# Patient Record
Sex: FEMALE | Race: BLACK OR AFRICAN AMERICAN | NOT HISPANIC OR LATINO | Employment: UNEMPLOYED | URBAN - METROPOLITAN AREA
[De-identification: names, ages, dates, MRNs, and addresses within clinical notes are randomized per-mention and may not be internally consistent; named-entity substitution may affect disease eponyms.]

---

## 2017-04-21 ENCOUNTER — OFFICE VISIT (OUTPATIENT)
Dept: FAMILY MEDICINE | Facility: CLINIC | Age: 47
End: 2017-04-21
Payer: COMMERCIAL

## 2017-04-21 VITALS
OXYGEN SATURATION: 99 % | HEIGHT: 62 IN | SYSTOLIC BLOOD PRESSURE: 130 MMHG | BODY MASS INDEX: 26.39 KG/M2 | DIASTOLIC BLOOD PRESSURE: 60 MMHG | WEIGHT: 143.44 LBS | HEART RATE: 94 BPM | TEMPERATURE: 98 F

## 2017-04-21 DIAGNOSIS — R05.9 COUGHING: ICD-10-CM

## 2017-04-21 DIAGNOSIS — Z12.11 SCREEN FOR COLON CANCER: ICD-10-CM

## 2017-04-21 DIAGNOSIS — R00.2 HEART PALPITATIONS: ICD-10-CM

## 2017-04-21 DIAGNOSIS — Z12.31 OTHER SCREENING MAMMOGRAM: ICD-10-CM

## 2017-04-21 DIAGNOSIS — Z00.00 PHYSICAL EXAM: Primary | ICD-10-CM

## 2017-04-21 PROCEDURE — 99386 PREV VISIT NEW AGE 40-64: CPT | Mod: S$GLB,,, | Performed by: NURSE PRACTITIONER

## 2017-04-21 PROCEDURE — 99999 PR PBB SHADOW E&M-EST. PATIENT-LVL III: CPT | Mod: PBBFAC,,, | Performed by: NURSE PRACTITIONER

## 2017-04-21 RX ORDER — METHYLPREDNISOLONE 4 MG/1
TABLET ORAL
Qty: 1 PACKAGE | Refills: 0 | Status: SHIPPED | OUTPATIENT
Start: 2017-04-21 | End: 2017-05-05 | Stop reason: ALTCHOICE

## 2017-04-21 RX ORDER — PROMETHAZINE HYDROCHLORIDE AND DEXTROMETHORPHAN HYDROBROMIDE 6.25; 15 MG/5ML; MG/5ML
5 SYRUP ORAL EVERY 6 HOURS PRN
Qty: 120 ML | Refills: 0 | Status: SHIPPED | OUTPATIENT
Start: 2017-04-21 | End: 2017-05-01

## 2017-04-21 NOTE — PROGRESS NOTES
Subjective:       Patient ID: Vipin Whitaker is a 46 y.o. female.    Chief Complaint: Annual Exam    HPI Comments: 46-year-old female presents to the clinic today for an annual physical exam.  She denies any specific complaints today.  She said her mother  of colorectal cancer in her early 60s says she thinks she needs a colonoscopy.  She is due for mammogram.  She states it and removing when she lays down in bed she has heart palpitations and then she falls asleep with them.  She denies any chest pain, shortness breath, diaphoresis, or nausea with these palpitations.    Past Medical History:   Diagnosis Date    Abnormal Pap smear      Past Surgical History:   Procedure Laterality Date    SALPINGECTOMY      he   reports that she has never smoked. She does not have any smokeless tobacco history on file. She reports that she does not drink alcohol.  Review of Systems   Constitutional: Negative for activity change, appetite change, chills, fatigue and fever.   HENT: Negative for congestion, ear discharge, ear pain, postnasal drip, rhinorrhea, sinus pressure, sneezing and sore throat.    Eyes: Negative for pain, discharge, redness and itching.   Respiratory: Positive for cough. Negative for shortness of breath and wheezing.    Cardiovascular: Positive for palpitations. Negative for chest pain and leg swelling.   Gastrointestinal: Negative for abdominal pain, constipation, diarrhea, nausea and vomiting.   Genitourinary: Negative for difficulty urinating, flank pain, hematuria and vaginal discharge.   Musculoskeletal: Negative for back pain, neck pain and neck stiffness.   Skin: Positive for rash.   Neurological: Negative for dizziness, light-headedness and headaches.       Objective:      Physical Exam   Constitutional: She is oriented to person, place, and time. She appears well-developed and well-nourished. No distress.   HENT:   Head: Normocephalic and atraumatic.   Right Ear: External ear normal.   Left  Ear: External ear normal.   Nose: Nose normal.   Mouth/Throat: Oropharynx is clear and moist. No oropharyngeal exudate.   Eyes: Conjunctivae and EOM are normal. Pupils are equal, round, and reactive to light. Right eye exhibits no discharge. Left eye exhibits no discharge. No scleral icterus.   Neck: Normal range of motion. Neck supple. No JVD present. No thyromegaly present.   Cardiovascular: Normal rate, regular rhythm and normal heart sounds.  Exam reveals no gallop and no friction rub.    No murmur heard.  Pulmonary/Chest: Effort normal and breath sounds normal. No respiratory distress. She has no wheezes. She has no rales.   Abdominal: Soft. Bowel sounds are normal. There is no tenderness.   Musculoskeletal: Normal range of motion. She exhibits no edema.   Lymphadenopathy:     She has no cervical adenopathy.   Neurological: She is alert and oriented to person, place, and time.   Skin: Skin is warm and dry. She is not diaphoretic.   Psychiatric: She has a normal mood and affect.       Assessment:       1. Physical exam    2. Other screening mammogram    3. Screen for colon cancer    4. Coughing    5. Heart palpitations        Plan:         Physical exam  -     CBC auto differential; Future; Expected date: 4/21/17  -     Comprehensive metabolic panel; Future; Expected date: 4/21/17  -     Lipid panel; Future; Expected date: 4/21/17  -     Hepatitis C antibody; Future; Expected date: 4/21/17  -     TSH; Future; Expected date: 4/21/17  -     Urinalysis; Future; Expected date: 4/21/17  -     Iron and TIBC; Future; Expected date: 4/21/17  -     Ferritin; Future; Expected date: 4/21/17  -     Folate; Future; Expected date: 4/21/17  -     Vitamin B12; Future; Expected date: 4/21/17  -     T4, free; Future; Expected date: 4/21/17  -     Vitamin D; Future; Expected date: 4/21/17    Other screening mammogram  -     Mammo Digital Screening Bilat with CAD; Future; Expected date: 4/21/17    Screen for colon cancer  -      Case request GI: COLONOSCOPY    Coughing  -     promethazine-dextromethorphan (PROMETHAZINE-DM) 6.25-15 mg/5 mL Syrp; Take 5 mLs by mouth every 6 (six) hours as needed.  Dispense: 120 mL; Refill: 0  -     methylPREDNISolone (MEDROL DOSEPACK) 4 mg tablet; use as directed  Dispense: 1 Package; Refill: 0    Heart palpitations  -     Ambulatory referral to Cardiology        Return in about 2 weeks (around 5/5/2017), or establish care with Dr. Zafar .

## 2017-04-21 NOTE — MR AVS SNAPSHOT
[facility]  [address]  [place] [address]  Phone: [phone]  Fax: [phone]                  Vipin Whitaker   2017 11:00 AM   Office Visit    Description:  Female : 1970   Provider:  GEMMA Zimmerman   Department:  [facility] Medicine           Reason for Visit     Annual Exam           Diagnoses this Visit        Comments    Physical exam    -  Primary     Other screening mammogram         Screen for colon cancer         Coughing                To Do List           Future Appointments        Provider Department Dept Phone    2017 7:00 AM Rena Zafar MD [facility] [phone]    2017 7:30 AM LAB, [facility] [phone]    2017 7:45 AM LAB, [facility] [phone]    2017 8:00 AM [facility] MAMMO1 [facility] [phone]    2017 9:00 AM GEMMA Zimmerman [facility] [phone]      Goals (5 Years of Data)     None       These Medications        Disp Refills Start End    promethazine-dextromethorphan (PROMETHAZINE-DM) 6.25-15 mg/5 mL Syrp 120 mL 0 2017    Take 5 mLs by mouth every 6 (six) hours as needed. - Oral    Pharmacy: [facility]/pharmacy #[id number] - [place] - [address] Ph #: [phone]       methylPREDNISolone (MEDROL DOSEPACK) 4 mg tablet 1 Package 0 2017     use as directed    Pharmacy: [facility]/pharmacy #[id number] - [place] - [address] Ph #: [phone]         [facility]     [facility] Care [facility] -  Assistance  Unless otherwise directed by your provider, please contact [facility], our nurse care line that is available for  assistance.     Registered nurses in the [facility] provide: appointment scheduling, clinical advisement, health education, and other advisory services.  Call: [phone] (toll free)               Medications  "          START taking these NEW medications        Refills    promethazine-dextromethorphan (PROMETHAZINE-DM) 6.25-15 mg/5 mL Syrp 0    Sig: Take 5 mLs by mouth every 6 (six) hours as needed.    Class: Normal    Route: Oral    methylPREDNISolone (MEDROL DOSEPACK) 4 mg tablet 0    Sig: use as directed    Class: Normal           Verify that the below list of medications is an accurate representation of the medications you are currently taking.  If none reported, the list may be blank. If incorrect, please contact your healthcare provider. Carry this list with you in case of emergency.           Current Medications     methylPREDNISolone (MEDROL DOSEPACK) 4 mg tablet use as directed    promethazine-dextromethorphan (PROMETHAZINE-DM) 6.25-15 mg/5 mL Syrp Take 5 mLs by mouth every 6 (six) hours as needed.           Clinical Reference Information           Your Vitals Were     BP Pulse Temp Height Weight Last Period    130/60 (BP Location: Left arm, Patient Position: Sitting, BP Method: Manual) 94 98.3 °F (36.8 °C) (Oral) 5' 2" (1.575 m) 65.1 kg (143 lb 6.6 oz) 06/21/2015    SpO2 BMI             99% 26.23 kg/m2         Blood Pressure          Most Recent Value    BP  130/60      Allergies as of 4/21/2017     No Known Allergies      Immunizations Administered on Date of Encounter - 4/21/2017     None      Orders Placed During Today's Visit      Normal Orders This Visit    Case request GI: COLONOSCOPY     Future Labs/Procedures Expected by Expires    CBC auto differential  4/21/2017 4/21/2018    Comprehensive metabolic panel  4/21/2017 4/21/2018    Ferritin  4/21/2017 4/21/2018    Folate  4/21/2017 4/21/2018    Hepatitis C antibody  4/21/2017 6/20/2018    Iron and TIBC  4/21/2017 4/21/2018    Lipid panel  4/21/2017 4/21/2018    Mammo Digital Screening Bilat with CAD  4/21/2017 6/21/2018    T4, free  4/21/2017 6/20/2018    TSH  4/21/2017 4/21/2018    Urinalysis  4/21/2017 6/20/2018    Vitamin B12  4/21/2017 4/21/2018    " Vitamin D  4/21/2017 4/21/2018      [facility] Sign-Up     Activating your [facility] account is as easy as 1-2-3!     1) Visit [email or web address], select Sign Up Now, enter this activation code and your date of birth, then select Next.  [email or web address]  Expires: 6/5/2017 11:30 AM      2) Create a username and password to use when you visit [facility] in the future and select a security question in case you lose your password and select Next.    3) Enter your e-mail address and click Sign Up!    Additional Information  If you have questions, please e-mail [email or web address] or call [phone] to talk to our [facility] staff. Remember, [facility] is NOT to be used for urgent needs. For medical emergencies, dial 911.         Language Assistance Services     ATTENTION: Language assistance services are available, free of charge. Please call [phone].      ATENCIÓN: Si habla español, tiene a estrada disposición servicios gratuitos de asistencia lingüística. Llame al [phone].     WILLIS Ý: N?u b?n nói Ti?ng Vi?t, có các d?ch v? h? tr? ngôn ng? mi?n phí dành cho b?n. G?i s? [phone].         [facility] complies with applicable Federal civil rights laws and does not discriminate on the basis of race, color, national origin, age, disability, or sex.

## 2017-05-05 ENCOUNTER — LAB VISIT (OUTPATIENT)
Dept: LAB | Facility: HOSPITAL | Age: 47
End: 2017-05-05
Attending: FAMILY MEDICINE
Payer: COMMERCIAL

## 2017-05-05 ENCOUNTER — OFFICE VISIT (OUTPATIENT)
Dept: FAMILY MEDICINE | Facility: CLINIC | Age: 47
End: 2017-05-05
Payer: COMMERCIAL

## 2017-05-05 VITALS
TEMPERATURE: 98 F | DIASTOLIC BLOOD PRESSURE: 76 MMHG | WEIGHT: 141.56 LBS | RESPIRATION RATE: 18 BRPM | SYSTOLIC BLOOD PRESSURE: 142 MMHG | OXYGEN SATURATION: 98 % | HEIGHT: 62 IN | HEART RATE: 87 BPM | BODY MASS INDEX: 26.05 KG/M2

## 2017-05-05 DIAGNOSIS — Z00.00 PHYSICAL EXAM: ICD-10-CM

## 2017-05-05 DIAGNOSIS — R63.4 WEIGHT LOSS: ICD-10-CM

## 2017-05-05 DIAGNOSIS — Z80.0 FAMILY HX OF COLON CANCER: ICD-10-CM

## 2017-05-05 DIAGNOSIS — Z11.3 SCREENING FOR STD (SEXUALLY TRANSMITTED DISEASE): ICD-10-CM

## 2017-05-05 DIAGNOSIS — Z12.4 PAP SMEAR FOR CERVICAL CANCER SCREENING: Primary | ICD-10-CM

## 2017-05-05 LAB
25(OH)D3+25(OH)D2 SERPL-MCNC: 27 NG/ML
ALBUMIN SERPL BCP-MCNC: 3.9 G/DL
ALP SERPL-CCNC: 120 U/L
ALT SERPL W/O P-5'-P-CCNC: 29 U/L
ANION GAP SERPL CALC-SCNC: 7 MMOL/L
AST SERPL-CCNC: 20 U/L
BASOPHILS # BLD AUTO: 0.01 K/UL
BASOPHILS NFR BLD: 0.3 %
BILIRUB SERPL-MCNC: 0.8 MG/DL
BUN SERPL-MCNC: 14 MG/DL
CALCIUM SERPL-MCNC: 9.6 MG/DL
CHLORIDE SERPL-SCNC: 108 MMOL/L
CHOLEST/HDLC SERPL: 2.4 {RATIO}
CO2 SERPL-SCNC: 26 MMOL/L
CREAT SERPL-MCNC: 0.6 MG/DL
DIFFERENTIAL METHOD: ABNORMAL
EOSINOPHIL # BLD AUTO: 0.2 K/UL
EOSINOPHIL NFR BLD: 4.2 %
ERYTHROCYTE [DISTWIDTH] IN BLOOD BY AUTOMATED COUNT: 13.4 %
EST. GFR  (AFRICAN AMERICAN): >60 ML/MIN/1.73 M^2
EST. GFR  (NON AFRICAN AMERICAN): >60 ML/MIN/1.73 M^2
FERRITIN SERPL-MCNC: 26 NG/ML
FOLATE SERPL-MCNC: 17.1 NG/ML
GLUCOSE SERPL-MCNC: 92 MG/DL
HCT VFR BLD AUTO: 35.3 %
HDL/CHOLESTEROL RATIO: 41 %
HDLC SERPL-MCNC: 117 MG/DL
HDLC SERPL-MCNC: 48 MG/DL
HGB BLD-MCNC: 11 G/DL
IRON SERPL-MCNC: 44 UG/DL
LDLC SERPL CALC-MCNC: 57.6 MG/DL
LYMPHOCYTES # BLD AUTO: 1.4 K/UL
LYMPHOCYTES NFR BLD: 36.8 %
MCH RBC QN AUTO: 25.1 PG
MCHC RBC AUTO-ENTMCNC: 31.2 %
MCV RBC AUTO: 80 FL
MONOCYTES # BLD AUTO: 0.6 K/UL
MONOCYTES NFR BLD: 15.7 %
NEUTROPHILS # BLD AUTO: 1.6 K/UL
NEUTROPHILS NFR BLD: 42.7 %
NONHDLC SERPL-MCNC: 69 MG/DL
PLATELET # BLD AUTO: 272 K/UL
PMV BLD AUTO: 12.6 FL
POTASSIUM SERPL-SCNC: 3.7 MMOL/L
PROT SERPL-MCNC: 7.2 G/DL
RBC # BLD AUTO: 4.39 M/UL
SATURATED IRON: 11 %
SODIUM SERPL-SCNC: 141 MMOL/L
T4 FREE SERPL-MCNC: 2.52 NG/DL
TOTAL IRON BINDING CAPACITY: 401 UG/DL
TRANSFERRIN SERPL-MCNC: 271 MG/DL
TRIGL SERPL-MCNC: 57 MG/DL
TSH SERPL DL<=0.005 MIU/L-ACNC: <0.01 UIU/ML
VIT B12 SERPL-MCNC: 363 PG/ML
WBC # BLD AUTO: 3.83 K/UL

## 2017-05-05 PROCEDURE — 82306 VITAMIN D 25 HYDROXY: CPT

## 2017-05-05 PROCEDURE — 80053 COMPREHEN METABOLIC PANEL: CPT

## 2017-05-05 PROCEDURE — 85025 COMPLETE CBC W/AUTO DIFF WBC: CPT

## 2017-05-05 PROCEDURE — 87480 CANDIDA DNA DIR PROBE: CPT

## 2017-05-05 PROCEDURE — 84439 ASSAY OF FREE THYROXINE: CPT

## 2017-05-05 PROCEDURE — 88175 CYTOPATH C/V AUTO FLUID REDO: CPT

## 2017-05-05 PROCEDURE — 87591 N.GONORRHOEAE DNA AMP PROB: CPT

## 2017-05-05 PROCEDURE — 82607 VITAMIN B-12: CPT

## 2017-05-05 PROCEDURE — 1160F RVW MEDS BY RX/DR IN RCRD: CPT | Mod: S$GLB,,, | Performed by: FAMILY MEDICINE

## 2017-05-05 PROCEDURE — 99999 PR PBB SHADOW E&M-EST. PATIENT-LVL IV: CPT | Mod: PBBFAC,,, | Performed by: FAMILY MEDICINE

## 2017-05-05 PROCEDURE — 80061 LIPID PANEL: CPT

## 2017-05-05 PROCEDURE — 83540 ASSAY OF IRON: CPT

## 2017-05-05 PROCEDURE — 82746 ASSAY OF FOLIC ACID SERUM: CPT

## 2017-05-05 PROCEDURE — 86803 HEPATITIS C AB TEST: CPT

## 2017-05-05 PROCEDURE — 84443 ASSAY THYROID STIM HORMONE: CPT

## 2017-05-05 PROCEDURE — 82728 ASSAY OF FERRITIN: CPT

## 2017-05-05 PROCEDURE — 99214 OFFICE O/P EST MOD 30 MIN: CPT | Mod: S$GLB,,, | Performed by: FAMILY MEDICINE

## 2017-05-05 NOTE — PROGRESS NOTES
Chief Complaint   Patient presents with    Annual Exam    Establish Care       HPI-pt's father also present and answers for pt  Vipin Whitaker is a 46 y.o. female with multiple medical diagnoses as listed in the medical history and problem list that presents for establishment of care . She is here for her breast exam and pap smear. Her annual was done by another provider at her last visit. She has not gotten the lab work yet and will get that today along with the mammogram. She has been having 20lb weight loss over the past 6 months. She has not had a change in her eating habits and eats three meals daily with snacks. She snacks all day long. She has not had any recent stress, though she seems anxious and fidgets. She is concerned because her mother  of colon cancer four years ago. She was diagnosed at age 62. The patient would like a colonoscopy and this has been ordered at her last visit. She has not had any change in bowel habits or blood in her stool.     She does drink 3 cups of coffee and six to eight sodas daily she drinks almost no water. She has frequent hot flashes and has been in menopause for two years.     PAST MEDICAL HISTORY:  Past Medical History:   Diagnosis Date    Abnormal Pap smear        PAST SURGICAL HISTORY:  Past Surgical History:   Procedure Laterality Date    SALPINGECTOMY         SOCIAL HISTORY:  Social History     Social History    Marital status:      Spouse name: N/A    Number of children: N/A    Years of education: N/A     Occupational History    Not on file.     Social History Main Topics    Smoking status: Never Smoker    Smokeless tobacco: Not on file    Alcohol use No    Drug use: Not on file    Sexual activity: Not on file     Other Topics Concern    Not on file     Social History Narrative       FAMILY HISTORY:  Family History   Problem Relation Age of Onset    Colon cancer Mother        ALLERGIES AND MEDICATIONS: updated and reviewed.  Review of  "patient's allergies indicates:  No Known Allergies  No current outpatient prescriptions on file.     No current facility-administered medications for this visit.        ROS  Review of Systems   Constitutional: Positive for unexpected weight change. Negative for chills, diaphoresis, fatigue and fever.   HENT: Negative for rhinorrhea, sinus pressure, sore throat and tinnitus.    Eyes: Negative for photophobia and visual disturbance.   Respiratory: Negative for cough, shortness of breath and wheezing.    Cardiovascular: Negative for chest pain and palpitations.   Gastrointestinal: Negative for abdominal pain, blood in stool, constipation, diarrhea, nausea and vomiting.   Genitourinary: Negative for dysuria, flank pain, frequency and vaginal discharge.   Musculoskeletal: Negative for arthralgias and joint swelling.   Skin: Negative for rash.   Neurological: Negative for speech difficulty, weakness, light-headedness and headaches.   Psychiatric/Behavioral: Negative for behavioral problems and dysphoric mood.       Physical Exam  Vitals:    05/05/17 0718   BP: (!) 140/66   Pulse: 87   Resp: 18   Temp: 98 °F (36.7 °C)   TempSrc: Oral   SpO2: 98%   Weight: 64.2 kg (141 lb 8.6 oz)   Height: 5' 2" (1.575 m)    Body mass index is 25.89 kg/(m^2).  Weight: 64.2 kg (141 lb 8.6 oz)   Height: 5' 2" (157.5 cm)     Physical Exam   Constitutional: She is oriented to person, place, and time. She appears well-developed and well-nourished. No distress.   HENT:   Head: Normocephalic and atraumatic.   Nose: Nose normal.   Mouth/Throat: Oropharynx is clear and moist. No oropharyngeal exudate.   Eyes: EOM are normal.   Neck: Neck supple.   Cardiovascular: Normal rate and regular rhythm.  Exam reveals no gallop and no friction rub.    No murmur heard.  Pulmonary/Chest: Effort normal and breath sounds normal. No respiratory distress. She has no wheezes. She has no rales.   Abdominal: Soft. Bowel sounds are normal. She exhibits no distension " and no mass. There is no tenderness. There is no rebound and no guarding.   Genitourinary:   Genitourinary Comments:   GYN Exam    Medical assistant was present in room and assisted with procedure. Pap smear was performed, specimen collected with cytobrush. No external lesions noted. Normal vaginal mucosa.  Cervix normal appearing cervix without discharge or lesions,no vaginal discharge.    Bimanual exam performed:  no cervical motion tenderness noted, no uterine or ovarian masses palpated.       inspection negative, no nipple discharge or bleeding, no masses or nodularity palpable No cervical, clavicular or axillary LAD         Lymphadenopathy:     She has no cervical adenopathy.   Neurological: She is alert and oriented to person, place, and time.   Skin: Skin is warm and dry. No rash noted.   Psychiatric: She has a normal mood and affect. Her behavior is normal.   Nursing note and vitals reviewed.      Health Maintenance       Date Due Completion Date    Lipid Panel 1970 ---    Pap Smear 4/24/2014 4/24/2013    Mammogram 5/1/2014 5/1/2013    Influenza Vaccine 8/1/2017 4/21/2017 (Declined)    Override on 4/21/2017: Declined    TETANUS VACCINE 5/5/2027 5/5/2017 (Declined)    Override on 5/5/2017: Declined            ASSESSMENT     1. Pap smear for cervical cancer screening    2. Weight loss    3. Family hx of colon cancer    4. Screening for STD (sexually transmitted disease)        PLAN:     Pap smear for cervical cancer screening  -     Liquid-based pap smear, screening    Weight loss    Family hx of colon cancer    Screening for STD (sexually transmitted disease)  -     C. trachomatis/N. gonorrhoeae by AMP DNA Urine  -     HIV-1 and HIV-2 antibodies; Future; Expected date: 5/5/17  -     RPR; Future; Expected date: 5/5/17  -     Hepatitis panel, acute; Future; Expected date: 5/5/17  -     Vaginosis Screen by DNA Probe        She should check with her insurance company to see that the c-scope is covered as she  has not yet reached ten years prior to diagnosis  I suspect her weight loss is more due to her caffeine intake but we will follow labwork and check thyroid and blood count  She should decrease her intake of cokes by one and increase water intake to 120 daily.        Rena Zafar MD  05/05/2017 8:39 AM        Return in about 4 weeks (around 6/2/2017) for Follow up.

## 2017-05-05 NOTE — MR AVS SNAPSHOT
[facility]  [address] [place] [address]  Phone: [phone]  Fax: [phone]                  Vipin Whitaker   2017 7:00 AM   Office Visit    Description:  Female : 1970   Provider:  Rena Zafar MD   Department:  [facility]           Reason for Visit     Annual Exam     Establish Care           Diagnoses this Visit        Comments    Pap smear for cervical cancer screening    -  Primary     Weight loss         Family hx of colon cancer         Screening for STD (sexually transmitted disease)                To Do List           Future Appointments        Provider Department Dept Phone    2017 8:45 AM LAB, [facility] [phone]    2017 10:00 AM Damion Bahena MD [facility] [phone]    2017 2:00 PM [facility] MAMMO1 [facility] [phone]      Goals (5 Years of Data)     None      Follow-Up and Disposition     Return in about 4 weeks (around 2017) for Follow up.      [facility]     [facility] Care [facility] -  Assistance  Unless otherwise directed by your provider, please contact [facility], our nurse care line that is available for  assistance.     Registered nurses in the [facility] provide: appointment scheduling, clinical advisement, health education, and other advisory services.  Call: [phone] (toll free)               Medications           STOP taking these medications     methylPREDNISolone (MEDROL DOSEPACK) 4 mg tablet use as directed           Verify that the below list of medications is an accurate representation of the medications you are currently taking.  If none reported, the list may be blank. If incorrect, please contact your healthcare provider. Carry this list with you in case of emergency.           Current Medications            Clinical Reference Information           Your Vitals Were     BP Pulse Temp Resp  "Height Weight    140/66 87 98 °F (36.7 °C) (Oral) 18 5' 2" (1.575 m) 64.2 kg (141 lb 8.6 oz)    Last Period SpO2 BMI          06/21/2015 98% 25.89 kg/m2        Blood Pressure          Most Recent Value    BP  (!)  140/66      Allergies as of 5/5/2017     No Known Allergies      Immunizations Administered on Date of Encounter - 5/5/2017     None      Orders Placed During Today's Visit      Normal Orders This Visit    C. trachomatis/N. gonorrhoeae by AMP DNA Urine     Liquid-based pap smear, screening     Vaginosis Screen by DNA Probe     Future Labs/Procedures Expected by Expires    Hepatitis panel, acute  5/5/2017 7/4/2018    HIV-1 and HIV-2 antibodies  5/5/2017 5/5/2018    RPR  5/5/2017 7/4/2018      [facility] Sign-Up     Activating your [facility] account is as easy as 1-2-3!     1) Visit [email or web address], select Sign Up Now, enter this activation code and your date of birth, then select Next.  [email or web address]  Expires: 6/5/2017 11:30 AM      2) Create a username and password to use when you visit [facility] in the future and select a security question in case you lose your password and select Next.    3) Enter your e-mail address and click Sign Up!    Additional Information  If you have questions, please e-mail [email or web address] or call [phone] to talk to our [facility] staff. Remember, [facility] is NOT to be used for urgent needs. For medical emergencies, dial 911.         Language Assistance Services     ATTENTION: Language assistance services are available, free of charge. Please call [phone].      ATENCIÓN: Si habla saritha, tiene a estrada disposición servicios gratuitos de asistencia lingüística. Llame al [phone].     CHÚ Ý: N?u b?n nói Ti?ng Vi?t, có các d?ch v? h? tr? ngôn ng? mi?n phí dành cho b?n. G?i s? [phone].         [facility] complies with applicable Federal civil rights laws and does not discriminate on the basis of race, color, national origin, age, " disability, or sex.

## 2017-05-06 LAB
C TRACH DNA SPEC QL NAA+PROBE: NOT DETECTED
CANDIDA RRNA VAG QL PROBE: NEGATIVE
G VAGINALIS RRNA GENITAL QL PROBE: POSITIVE
N GONORRHOEA DNA SPEC QL NAA+PROBE: NOT DETECTED
T VAGINALIS RRNA GENITAL QL PROBE: NEGATIVE

## 2017-05-08 ENCOUNTER — TELEPHONE (OUTPATIENT)
Dept: FAMILY MEDICINE | Facility: CLINIC | Age: 47
End: 2017-05-08

## 2017-05-08 DIAGNOSIS — R79.89 ABNORMAL TSH: Primary | ICD-10-CM

## 2017-05-08 LAB — HCV AB SERPL QL IA: NEGATIVE

## 2017-05-08 NOTE — TELEPHONE ENCOUNTER
I spoke with the patient and explained that her TSH was low and T4 was high which means you might have hyperthyroidism which is treated by a endocrine specialist. I will refer her to one. I also recommended starting Vitamin D 2,000 IU daily. Patient verbalized understanding of above.

## 2017-05-10 ENCOUNTER — TELEPHONE (OUTPATIENT)
Dept: FAMILY MEDICINE | Facility: CLINIC | Age: 47
End: 2017-05-10

## 2017-08-10 ENCOUNTER — LAB VISIT (OUTPATIENT)
Dept: LAB | Facility: HOSPITAL | Age: 47
End: 2017-08-10
Attending: INTERNAL MEDICINE
Payer: COMMERCIAL

## 2017-08-10 DIAGNOSIS — E05.90 HYPERTHYROIDISM: ICD-10-CM

## 2017-08-10 LAB
T3 SERPL-MCNC: 353 NG/DL
T4 FREE SERPL-MCNC: 3.41 NG/DL
TSH SERPL DL<=0.005 MIU/L-ACNC: <0.01 UIU/ML

## 2017-08-10 PROCEDURE — 84480 ASSAY TRIIODOTHYRONINE (T3): CPT

## 2017-08-10 PROCEDURE — 84445 ASSAY OF TSI GLOBULIN: CPT

## 2017-08-10 PROCEDURE — 84443 ASSAY THYROID STIM HORMONE: CPT

## 2017-08-10 PROCEDURE — 84439 ASSAY OF FREE THYROXINE: CPT

## 2017-08-10 PROCEDURE — 86376 MICROSOMAL ANTIBODY EACH: CPT

## 2017-08-11 LAB — THYROPEROXIDASE IGG SERPL-ACNC: 283 IU/ML

## 2017-08-14 LAB — TSI SER-ACNC: 36.4 IU/L

## 2017-08-25 ENCOUNTER — HOSPITAL ENCOUNTER (OUTPATIENT)
Dept: RADIOLOGY | Facility: HOSPITAL | Age: 47
Discharge: HOME | End: 2017-08-25
Attending: INTERNAL MEDICINE
Payer: COMMERCIAL

## 2017-08-25 DIAGNOSIS — E05.90 HYPERTHYROIDISM: ICD-10-CM

## 2017-08-25 PROCEDURE — 76536 US EXAM OF HEAD AND NECK: CPT | Mod: 26,,, | Performed by: RADIOLOGY

## 2017-08-25 PROCEDURE — 76536 US EXAM OF HEAD AND NECK: CPT | Mod: TC

## 2018-04-24 ENCOUNTER — TELEPHONE (OUTPATIENT)
Dept: FAMILY MEDICINE | Facility: CLINIC | Age: 48
End: 2018-04-24

## 2018-04-24 DIAGNOSIS — Z12.31 ENCOUNTER FOR SCREENING MAMMOGRAM FOR BREAST CANCER: Primary | ICD-10-CM

## 2018-04-24 NOTE — TELEPHONE ENCOUNTER
----- Message from Guillermina Irvin sent at 4/24/2018  7:51 AM CDT -----  Contact: Annika SY [phone]  Pt is scheduled for a mammo on 4-25-18 , the orders are expried. Pls add new orders. Thanks.......Marlyn

## 2018-04-25 ENCOUNTER — OFFICE VISIT (OUTPATIENT)
Dept: FAMILY MEDICINE | Facility: CLINIC | Age: 48
End: 2018-04-25
Payer: COMMERCIAL

## 2018-04-25 VITALS
DIASTOLIC BLOOD PRESSURE: 86 MMHG | SYSTOLIC BLOOD PRESSURE: 156 MMHG | HEART RATE: 84 BPM | TEMPERATURE: 98 F | WEIGHT: 130.81 LBS | BODY MASS INDEX: 24.07 KG/M2 | OXYGEN SATURATION: 98 % | HEIGHT: 62 IN

## 2018-04-25 DIAGNOSIS — Z00.00 ROUTINE PHYSICAL EXAMINATION: Primary | ICD-10-CM

## 2018-04-25 DIAGNOSIS — R03.0 ELEVATED BLOOD PRESSURE READING: ICD-10-CM

## 2018-04-25 DIAGNOSIS — E05.90 HYPERTHYROIDISM: ICD-10-CM

## 2018-04-25 PROCEDURE — 99999 PR PBB SHADOW E&M-EST. PATIENT-LVL III: CPT | Mod: PBBFAC,,, | Performed by: NURSE PRACTITIONER

## 2018-04-25 PROCEDURE — 99396 PREV VISIT EST AGE 40-64: CPT | Mod: S$GLB,,, | Performed by: NURSE PRACTITIONER

## 2018-04-25 NOTE — PATIENT INSTRUCTIONS
Continue all current medications   Follow up with Dr. Chisholm   Return for a blood pressure check up with labs

## 2018-04-25 NOTE — PROGRESS NOTES
Subjective:       Patient ID: Vipin Whitaker is a 47 y.o. female.    Chief Complaint: Annual Exam and Gynecologic Exam    47-year-old female presents to the clinic today for routine physical.  She complains of weight loss.  She has had a 11 pound weight loss in the past year.  She is currently being treated for hyperthyroidism per Dr. Chisholm.  She did miss her last appointment with her.  I stressed the importance of needing to follow-up with her as soon as possible.  I explained to her that she can do Pap smear every 3 years.  She did not do her mammogram last year.  She is scheduled to have her mammogram done today.      Past Medical History:   Diagnosis Date    Abnormal Pap smear      Past Surgical History:   Procedure Laterality Date    COLONOSCOPY N/A 11/3/2017    Procedure: COLONOSCOPY;  Surgeon: Leighton Ross MD;  Location: [facility];  Service: Endoscopy;  Laterality: N/A;    SALPINGECTOMY        reports that she has never smoked. She does not have any smokeless tobacco history on file. She reports that she does not drink alcohol.  Review of Systems   Constitutional: Positive for unexpected weight change. Negative for chills and fever.   HENT: Negative for congestion, ear discharge, ear pain, postnasal drip, rhinorrhea, sinus pressure and sore throat.    Eyes: Negative for pain, discharge, redness and itching.   Respiratory: Negative for cough, shortness of breath and wheezing.    Cardiovascular: Negative for chest pain, palpitations and leg swelling.   Gastrointestinal: Negative for abdominal pain, constipation, diarrhea, nausea and vomiting.   Genitourinary: Negative for difficulty urinating, dysuria, frequency, hematuria and urgency.   Musculoskeletal: Negative for gait problem, neck pain and neck stiffness.   Skin: Negative for rash.   Neurological: Negative for dizziness, light-headedness and headaches.       Objective:      Physical Exam   Constitutional: She is oriented to person, place, and  time. She appears well-developed and well-nourished. No distress.   HENT:   Head: Normocephalic and atraumatic.   Right Ear: External ear normal.   Left Ear: External ear normal.   Nose: Nose normal.   Mouth/Throat: Oropharynx is clear and moist.   Eyes: Conjunctivae and EOM are normal. Pupils are equal, round, and reactive to light. Right eye exhibits no discharge. Left eye exhibits no discharge. No scleral icterus.   Neck: Normal range of motion. Neck supple. No JVD present. No thyromegaly present.   Cardiovascular: Normal rate and regular rhythm.  Exam reveals no friction rub.    No murmur heard.  Pulmonary/Chest: Effort normal and breath sounds normal. No respiratory distress. She has no wheezes. She has no rales.   Abdominal: Soft. Bowel sounds are normal. There is no tenderness. There is no rebound and no guarding.   Genitourinary:   Genitourinary Comments: Breast symmetrical non-tender to palpation no palpable masses, tenderness or nipple discharge no axilla adenopathy    Musculoskeletal: Normal range of motion. She exhibits no edema.   Lymphadenopathy:     She has no cervical adenopathy.   Neurological: She is alert and oriented to person, place, and time. She displays normal reflexes.   Skin: Skin is warm and dry. No rash noted. She is not diaphoretic.   Psychiatric: She has a normal mood and affect. Her behavior is normal. Judgment and thought content normal.       Assessment:       1. Routine physical examination    2. Hyperthyroidism    3. Elevated blood pressure reading        Plan:         Routine physical examination  -     CBC auto differential; Future; Expected date: 04/25/2018  -     Comprehensive metabolic panel; Future; Expected date: 04/25/2018  -     Lipid panel; Future; Expected date: 04/25/2018  -     Urinalysis; Future; Expected date: 04/25/2018    Hyperthyroidism  -     TSH; Future; Expected date: 04/25/2018  -     T4, free; Future; Expected date: 04/25/2018  -     T3, free; Future;  Expected date: 04/25/2018    Elevated blood pressure reading  - return for a blood pressure check up

## 2018-05-07 ENCOUNTER — LAB VISIT (OUTPATIENT)
Dept: LAB | Facility: HOSPITAL | Age: 48
End: 2018-05-07
Payer: COMMERCIAL

## 2018-05-07 ENCOUNTER — CLINICAL SUPPORT (OUTPATIENT)
Dept: FAMILY MEDICINE | Facility: CLINIC | Age: 48
End: 2018-05-07
Payer: COMMERCIAL

## 2018-05-07 VITALS — SYSTOLIC BLOOD PRESSURE: 132 MMHG | HEART RATE: 63 BPM | DIASTOLIC BLOOD PRESSURE: 80 MMHG

## 2018-05-07 DIAGNOSIS — Z00.00 ROUTINE PHYSICAL EXAMINATION: ICD-10-CM

## 2018-05-07 DIAGNOSIS — R03.0 ELEVATED BLOOD PRESSURE READING WITHOUT DIAGNOSIS OF HYPERTENSION: Primary | ICD-10-CM

## 2018-05-07 LAB
BACTERIA #/AREA URNS AUTO: ABNORMAL /HPF
BILIRUB UR QL STRIP: NEGATIVE
CLARITY UR REFRACT.AUTO: ABNORMAL
COLOR UR AUTO: YELLOW
GLUCOSE UR QL STRIP: NEGATIVE
HGB UR QL STRIP: NEGATIVE
HYALINE CASTS UR QL AUTO: 3 /LPF
KETONES UR QL STRIP: NEGATIVE
LEUKOCYTE ESTERASE UR QL STRIP: NEGATIVE
MICROSCOPIC COMMENT: ABNORMAL
NITRITE UR QL STRIP: NEGATIVE
PH UR STRIP: 5 [PH] (ref 5–8)
PROT UR QL STRIP: ABNORMAL
RBC #/AREA URNS AUTO: 0 /HPF (ref 0–4)
SP GR UR STRIP: 1.02 (ref 1–1.03)
SQUAMOUS #/AREA URNS AUTO: 7 /HPF
URN SPEC COLLECT METH UR: ABNORMAL
UROBILINOGEN UR STRIP-ACNC: 2 EU/DL
WBC #/AREA URNS AUTO: 5 /HPF (ref 0–5)

## 2018-05-07 PROCEDURE — 99999 PR PBB SHADOW E&M-EST. PATIENT-LVL I: CPT | Mod: PBBFAC,,,

## 2018-05-07 PROCEDURE — 81001 URINALYSIS AUTO W/SCOPE: CPT

## 2018-05-07 PROCEDURE — 99499 UNLISTED E&M SERVICE: CPT | Mod: S$GLB,,, | Performed by: FAMILY MEDICINE

## 2018-05-07 NOTE — PROGRESS NOTES
Vipin Whitaker 47 y.o. female is here today for Blood Pressure check.   History of HTN no.      Review of patient's allergies indicates:  No Known Allergies  Creatinine   Date Value Ref Range Status   04/25/2018 0.69 0.57 - 1.00 mg/dL Final     Sodium   Date Value Ref Range Status   04/25/2018 141 134 - 144 mmol/L Final     Potassium   Date Value Ref Range Status   04/25/2018 4.6 3.5 - 5.2 mmol/L Final   ]  Patient denies taking blood pressure medications .    Current Outpatient Prescriptions:     methimazole (TAPAZOLE) 10 MG Tab, Take 2 tablets (20 mg total) by mouth once daily., Disp: 60 tablet, Rfl: 11    propranolol (INDERAL) 20 MG tablet, Take 1 tablet (20 mg total) by mouth 3 (three) times daily., Disp: 90 tablet, Rfl: 11  Does patient have record of home blood pressure readings no. . .  Patient is asymptomatic.   Complains of none.    Vitals:    05/07/18 0723   BP: 132/80   BP Location: Right arm   Patient Position: Sitting   BP Method: Small (Manual)   Pulse: 63         Dr. Zafar notified.

## 2018-05-08 ENCOUNTER — TELEPHONE (OUTPATIENT)
Dept: FAMILY MEDICINE | Facility: CLINIC | Age: 48
End: 2018-05-08

## 2018-05-08 NOTE — TELEPHONE ENCOUNTER
I left a message on patient's voice mail that her TSH was elevated and the rest of her labs were normal. She needed to follow up with Dr. Chisholm to discuss her thyroid medications. If she had any questions to feel free to call the office.

## 2018-05-08 NOTE — TELEPHONE ENCOUNTER
I spoke with the patient and explained that her TSH was elevated and she needed to follow up with Dr. Chisholm to discuss her thyroid medication. Patient verbalized understanding of above.

## 2018-10-15 ENCOUNTER — LAB VISIT (OUTPATIENT)
Dept: LAB | Facility: HOSPITAL | Age: 48
End: 2018-10-15
Attending: INTERNAL MEDICINE
Payer: COMMERCIAL

## 2018-10-15 DIAGNOSIS — R60.0 BILATERAL LEG EDEMA: ICD-10-CM

## 2018-10-15 DIAGNOSIS — R14.0 ABDOMINAL DISTENSION: ICD-10-CM

## 2018-10-15 DIAGNOSIS — E05.00 GRAVES DISEASE: ICD-10-CM

## 2018-10-15 LAB
ALBUMIN SERPL BCP-MCNC: 4.2 G/DL
ALP SERPL-CCNC: 52 U/L
ALT SERPL W/O P-5'-P-CCNC: 18 U/L
ANION GAP SERPL CALC-SCNC: 8 MMOL/L
AST SERPL-CCNC: 24 U/L
BASOPHILS # BLD AUTO: 0.01 K/UL
BASOPHILS NFR BLD: 0.2 %
BILIRUB SERPL-MCNC: 1.8 MG/DL
BUN SERPL-MCNC: 8 MG/DL
CALCIUM SERPL-MCNC: 9.9 MG/DL
CHLORIDE SERPL-SCNC: 110 MMOL/L
CO2 SERPL-SCNC: 24 MMOL/L
CREAT SERPL-MCNC: 1.2 MG/DL
DIFFERENTIAL METHOD: ABNORMAL
EOSINOPHIL # BLD AUTO: 0 K/UL
EOSINOPHIL NFR BLD: 0.6 %
ERYTHROCYTE [DISTWIDTH] IN BLOOD BY AUTOMATED COUNT: 14.9 %
EST. GFR  (AFRICAN AMERICAN): >60 ML/MIN/1.73 M^2
EST. GFR  (NON AFRICAN AMERICAN): 53 ML/MIN/1.73 M^2
GLUCOSE SERPL-MCNC: 98 MG/DL
HCT VFR BLD AUTO: 39.4 %
HGB BLD-MCNC: 13.1 G/DL
LYMPHOCYTES # BLD AUTO: 1.2 K/UL
LYMPHOCYTES NFR BLD: 23.3 %
MCH RBC QN AUTO: 29.9 PG
MCHC RBC AUTO-ENTMCNC: 33.2 G/DL
MCV RBC AUTO: 90 FL
MONOCYTES # BLD AUTO: 0.4 K/UL
MONOCYTES NFR BLD: 8.3 %
NEUTROPHILS # BLD AUTO: 3.4 K/UL
NEUTROPHILS NFR BLD: 67.6 %
PLATELET # BLD AUTO: 276 K/UL
PMV BLD AUTO: 12.7 FL
POTASSIUM SERPL-SCNC: 4.9 MMOL/L
PROT SERPL-MCNC: 7.1 G/DL
RBC # BLD AUTO: 4.38 M/UL
SODIUM SERPL-SCNC: 142 MMOL/L
T4 FREE SERPL-MCNC: <0.4 NG/DL
TSH SERPL DL<=0.005 MIU/L-ACNC: 80.34 UIU/ML
WBC # BLD AUTO: 5.06 K/UL

## 2018-10-15 PROCEDURE — 36415 COLL VENOUS BLD VENIPUNCTURE: CPT

## 2018-10-15 PROCEDURE — 85025 COMPLETE CBC W/AUTO DIFF WBC: CPT

## 2018-10-15 PROCEDURE — 84480 ASSAY TRIIODOTHYRONINE (T3): CPT

## 2018-10-15 PROCEDURE — 84439 ASSAY OF FREE THYROXINE: CPT

## 2018-10-15 PROCEDURE — 80053 COMPREHEN METABOLIC PANEL: CPT

## 2018-10-15 PROCEDURE — 84443 ASSAY THYROID STIM HORMONE: CPT

## 2018-10-16 LAB — T3 SERPL-MCNC: 43 NG/DL

## 2018-10-18 PROBLEM — E03.9 HYPOTHYROIDISM: Status: ACTIVE | Noted: 2018-10-18

## 2018-10-18 PROBLEM — R17 ELEVATED BILIRUBIN: Status: ACTIVE | Noted: 2018-10-18

## 2018-10-18 PROBLEM — M79.10 MYALGIA: Status: ACTIVE | Noted: 2018-10-18

## 2018-10-18 PROBLEM — E03.9 MYXEDEMA: Status: ACTIVE | Noted: 2018-10-18

## 2018-10-18 PROBLEM — R41.3 MEMORY IMPAIRMENT: Status: ACTIVE | Noted: 2018-10-18

## 2018-10-18 PROBLEM — L81.9 HYPERPIGMENTATION OF SKIN: Status: ACTIVE | Noted: 2018-10-18

## 2018-10-18 PROBLEM — R60.9 FLUID RETENTION: Status: ACTIVE | Noted: 2018-10-18

## 2018-10-27 ENCOUNTER — HOSPITAL ENCOUNTER (INPATIENT)
Facility: HOSPITAL | Age: 48
LOS: 2 days | Discharge: HOME HEALTH | DRG: 287 | End: 2018-10-31
Attending: EMERGENCY MEDICINE | Admitting: INTERNAL MEDICINE
Payer: COMMERCIAL

## 2018-10-27 DIAGNOSIS — M79.89 LEG SWELLING: ICD-10-CM

## 2018-10-27 DIAGNOSIS — I50.41 ACUTE COMBINED SYSTOLIC AND DIASTOLIC CONGESTIVE HEART FAILURE: ICD-10-CM

## 2018-10-27 DIAGNOSIS — R79.89 ELEVATED TROPONIN: ICD-10-CM

## 2018-10-27 DIAGNOSIS — E03.9 MYXEDEMA: ICD-10-CM

## 2018-10-27 DIAGNOSIS — R94.31 ST SEGMENT DEPRESSION: ICD-10-CM

## 2018-10-27 DIAGNOSIS — R79.89 ELEVATED D-DIMER: ICD-10-CM

## 2018-10-27 DIAGNOSIS — R80.9 PROTEINURIA, UNSPECIFIED TYPE: ICD-10-CM

## 2018-10-27 DIAGNOSIS — R00.0 TACHYCARDIA INDUCED CARDIOMYOPATHY: Primary | ICD-10-CM

## 2018-10-27 DIAGNOSIS — I42.8 NON-ISCHEMIC CARDIOMYOPATHY: ICD-10-CM

## 2018-10-27 DIAGNOSIS — E05.90 HYPERTHYROIDISM: ICD-10-CM

## 2018-10-27 DIAGNOSIS — I43 TACHYCARDIA INDUCED CARDIOMYOPATHY: Primary | ICD-10-CM

## 2018-10-27 DIAGNOSIS — I16.1 HYPERTENSIVE EMERGENCY: ICD-10-CM

## 2018-10-27 DIAGNOSIS — R52 ACUTE PAIN: ICD-10-CM

## 2018-10-27 DIAGNOSIS — R60.9 FLUID RETENTION: ICD-10-CM

## 2018-10-27 DIAGNOSIS — I10 HYPERTENSION, UNSPECIFIED TYPE: ICD-10-CM

## 2018-10-27 LAB
ANION GAP SERPL CALC-SCNC: 13 MMOL/L
BASOPHILS # BLD AUTO: 0.01 K/UL
BASOPHILS NFR BLD: 0.2 %
BUN SERPL-MCNC: 12 MG/DL
CALCIUM SERPL-MCNC: 9.2 MG/DL
CHLORIDE SERPL-SCNC: 111 MMOL/L
CO2 SERPL-SCNC: 21 MMOL/L
CREAT SERPL-MCNC: 0.9 MG/DL
D DIMER PPP IA.FEU-MCNC: 0.7 MG/L FEU
DIFFERENTIAL METHOD: ABNORMAL
EOSINOPHIL # BLD AUTO: 0.1 K/UL
EOSINOPHIL NFR BLD: 1 %
ERYTHROCYTE [DISTWIDTH] IN BLOOD BY AUTOMATED COUNT: 15.8 %
EST. GFR  (AFRICAN AMERICAN): >60 ML/MIN/1.73 M^2
EST. GFR  (NON AFRICAN AMERICAN): >60 ML/MIN/1.73 M^2
GLUCOSE SERPL-MCNC: 108 MG/DL
HCT VFR BLD AUTO: 35 %
HGB BLD-MCNC: 11.5 G/DL
INR PPP: 1.5
LYMPHOCYTES # BLD AUTO: 1.2 K/UL
LYMPHOCYTES NFR BLD: 22.2 %
MCH RBC QN AUTO: 30.9 PG
MCHC RBC AUTO-ENTMCNC: 32.9 G/DL
MCV RBC AUTO: 94 FL
MONOCYTES # BLD AUTO: 0.5 K/UL
MONOCYTES NFR BLD: 9.4 %
NEUTROPHILS # BLD AUTO: 3.5 K/UL
NEUTROPHILS NFR BLD: 67.2 %
PLATELET # BLD AUTO: 258 K/UL
PMV BLD AUTO: 13.5 FL
POTASSIUM SERPL-SCNC: 3.2 MMOL/L
PROTHROMBIN TIME: 15.2 SEC
RBC # BLD AUTO: 3.72 M/UL
SODIUM SERPL-SCNC: 145 MMOL/L
TROPONIN I SERPL DL<=0.01 NG/ML-MCNC: 0.03 NG/ML
TROPONIN I SERPL DL<=0.01 NG/ML-MCNC: 0.03 NG/ML
WBC # BLD AUTO: 5.22 K/UL

## 2018-10-27 PROCEDURE — 99285 EMERGENCY DEPT VISIT HI MDM: CPT | Mod: 25

## 2018-10-27 PROCEDURE — 80048 BASIC METABOLIC PNL TOTAL CA: CPT

## 2018-10-27 PROCEDURE — 93005 ELECTROCARDIOGRAM TRACING: CPT

## 2018-10-27 PROCEDURE — 85379 FIBRIN DEGRADATION QUANT: CPT

## 2018-10-27 PROCEDURE — 25000003 PHARM REV CODE 250: Performed by: EMERGENCY MEDICINE

## 2018-10-27 PROCEDURE — G0378 HOSPITAL OBSERVATION PER HR: HCPCS

## 2018-10-27 PROCEDURE — 96374 THER/PROPH/DIAG INJ IV PUSH: CPT | Performed by: EMERGENCY MEDICINE

## 2018-10-27 PROCEDURE — 25500020 PHARM REV CODE 255: Performed by: EMERGENCY MEDICINE

## 2018-10-27 PROCEDURE — 63600175 PHARM REV CODE 636 W HCPCS: Performed by: EMERGENCY MEDICINE

## 2018-10-27 PROCEDURE — 84484 ASSAY OF TROPONIN QUANT: CPT | Mod: 91

## 2018-10-27 PROCEDURE — 36415 COLL VENOUS BLD VENIPUNCTURE: CPT

## 2018-10-27 PROCEDURE — 93010 ELECTROCARDIOGRAM REPORT: CPT | Mod: ,,, | Performed by: INTERNAL MEDICINE

## 2018-10-27 PROCEDURE — 85025 COMPLETE CBC W/AUTO DIFF WBC: CPT

## 2018-10-27 PROCEDURE — 85610 PROTHROMBIN TIME: CPT

## 2018-10-27 RX ORDER — CLONIDINE HYDROCHLORIDE 0.1 MG/1
0.1 TABLET ORAL
Status: COMPLETED | OUTPATIENT
Start: 2018-10-27 | End: 2018-10-27

## 2018-10-27 RX ORDER — LIOTHYRONINE SODIUM 5 UG/1
10 TABLET ORAL DAILY
Status: DISCONTINUED | OUTPATIENT
Start: 2018-10-28 | End: 2018-10-31 | Stop reason: HOSPADM

## 2018-10-27 RX ORDER — FUROSEMIDE 10 MG/ML
20 INJECTION INTRAMUSCULAR; INTRAVENOUS 2 TIMES DAILY
Status: DISCONTINUED | OUTPATIENT
Start: 2018-10-27 | End: 2018-10-31

## 2018-10-27 RX ORDER — ASPIRIN 325 MG
325 TABLET, DELAYED RELEASE (ENTERIC COATED) ORAL DAILY
Status: DISCONTINUED | OUTPATIENT
Start: 2018-10-27 | End: 2018-10-30

## 2018-10-27 RX ORDER — POTASSIUM CHLORIDE 20 MEQ/15ML
40 SOLUTION ORAL
Status: COMPLETED | OUTPATIENT
Start: 2018-10-27 | End: 2018-10-27

## 2018-10-27 RX ORDER — LEVOTHYROXINE SODIUM 75 UG/1
75 TABLET ORAL DAILY
Status: DISCONTINUED | OUTPATIENT
Start: 2018-10-28 | End: 2018-10-31 | Stop reason: HOSPADM

## 2018-10-27 RX ORDER — CLONIDINE HYDROCHLORIDE 0.1 MG/1
0.2 TABLET ORAL EVERY 4 HOURS PRN
Status: DISCONTINUED | OUTPATIENT
Start: 2018-10-27 | End: 2018-10-31 | Stop reason: HOSPADM

## 2018-10-27 RX ORDER — PROPRANOLOL HYDROCHLORIDE 10 MG/1
20 TABLET ORAL 3 TIMES DAILY
Status: DISCONTINUED | OUTPATIENT
Start: 2018-10-27 | End: 2018-10-30

## 2018-10-27 RX ADMIN — PROPRANOLOL HYDROCHLORIDE 20 MG: 10 TABLET ORAL at 08:10

## 2018-10-27 RX ADMIN — ASPIRIN 325 MG: 325 TABLET, DELAYED RELEASE ORAL at 04:10

## 2018-10-27 RX ADMIN — CLONIDINE HYDROCHLORIDE 0.1 MG: 0.1 TABLET ORAL at 02:10

## 2018-10-27 RX ADMIN — FUROSEMIDE 20 MG: 10 INJECTION, SOLUTION INTRAMUSCULAR; INTRAVENOUS at 07:10

## 2018-10-27 RX ADMIN — IOHEXOL 75 ML: 350 INJECTION, SOLUTION INTRAVENOUS at 04:10

## 2018-10-27 RX ADMIN — CLONIDINE HYDROCHLORIDE 0.1 MG: 0.1 TABLET ORAL at 03:10

## 2018-10-27 RX ADMIN — POTASSIUM CHLORIDE 40 MEQ: 20 SOLUTION ORAL at 07:10

## 2018-10-27 NOTE — Clinical Note
Hemodynamics performed in the main pulmonary artery. Oxygen saturation obtained at 55.3 %. Cardiac output obtained at 4.91 L/min.

## 2018-10-27 NOTE — ED TRIAGE NOTES
Pt is very poor historian and could not remember why she came to hospital when I spoke to her, family reports multiple reasons why she is here and reports her blood pressure has been running high.  Pt denies chest pain, sob, f/c/n/v/d.  Reports swelling to bilateral feet, notable +2 pitting edema noted to BLE's.  Placed on cardiac monitor, bp cuff, and pulse ox.  Does not appear to be in acute distress, will continue to monitor.

## 2018-10-27 NOTE — Clinical Note
18 ml injected throughout the case. 132 mL total wasted during the case. 150 mL total used in the case.

## 2018-10-27 NOTE — DISCHARGE INSTRUCTIONS
"Return to the Emergency Department of any acute worsening of your symptoms or for any other concern.     You should return to the ED for fever/chills, shortness of breath, chest pain, weakness or "passing out".     Pt should take all medications as prescribed.    Pt should follow up with PCP as soon as possible.    The risks associated with not taking your medications as prescribed and not following up with your Primary Care doctor or sub specialist includes worsening of your condition, pain, disability, loss of function or livelihood, and death      LUKE Mireles M.D. 2:10 PM 10/27/2018      Our goal in the emergency department is to always give you outstanding care and exceptional service. You may receive a survey by mail or e-mail in the next week regarding your experience in our ED. We would greatly appreciate your completing and returning the survey. Your feedback provides us with a way to recognize our staff who give very good care and it helps us learn how to improve when your experience was below our aspiration of excellence.     "

## 2018-10-27 NOTE — ED PROVIDER NOTES
"Encounter Date: 10/27/2018    SCRIBE #1 NOTE: I, Marta Mejia, am scribing for, and in the presence of,  Jean Mireles MD. I have scribed the following portions of the note - Other sections scribed: HPI, ROS.       History     Chief Complaint   Patient presents with    Hypertension     Pt came in because her family is concerned about her pressure. Leg swelling noted. Pt denies hx of HTN     CC: Hypertension    46 y/o female presents to ED for emergent evaluation of hypertension that occurred today.  Pt reports hx of similar pressure.  Pt also notes "fluid buildup" in bilateral legs and feet.  Pt denies chest pain, SOB, abdominal pain, fever, chills, and N/V/D.  No tx at home.  No other symptoms reported.    Internal Medicine: Dr. Kaya Chisholm      The history is provided by the patient. No  was used.     Review of patient's allergies indicates:  No Known Allergies  Past Medical History:   Diagnosis Date    Abnormal Pap smear     Depression     Hypertension 10/27/2018    Thyroid disease      Past Surgical History:   Procedure Laterality Date    COLONOSCOPY N/A 11/3/2017    Procedure: COLONOSCOPY;  Surgeon: Leighton Ross MD;  Location: [facility] ENDO;  Service: Endoscopy;  Laterality: N/A;    COLONOSCOPY N/A 11/3/2017    Performed by Leighton Ross MD at [facility] ENDO    SALPINGECTOMY       Family History   Problem Relation Age of Onset    Colon cancer Mother     Hypertension Mother     No Known Problems Father     Cancer Sister     No Known Problems Brother     Cancer Maternal Aunt     Heart murmur Maternal Uncle      Social History     Tobacco Use    Smoking status: Never Smoker   Substance Use Topics    Alcohol use: No    Drug use: No     Review of Systems   Constitutional: Negative for chills and fever.   HENT: Negative for congestion and rhinorrhea.    Eyes: Negative for pain.   Respiratory: Negative for cough and shortness of breath.    Cardiovascular: Negative for " chest pain and palpitations.   Gastrointestinal: Negative for abdominal pain, diarrhea, nausea and vomiting.   Genitourinary: Negative for dysuria and urgency.   Musculoskeletal: Negative for back pain and neck pain.        (+) bilateral leg and feet swelling   Skin: Negative for wound.   Neurological: Negative for syncope and headaches.   All other systems reviewed and are negative.      Physical Exam     Initial Vitals [10/27/18 1335]   BP Pulse Resp Temp SpO2   (!) 159/105 81 18 97.8 °F (36.6 °C) 96 %      MAP       --         Physical Exam    Vitals reviewed.  Constitutional: She appears well-developed and well-nourished.   HENT:   Head: Normocephalic and atraumatic.   Nose: Nose normal.   Mouth/Throat: No oropharyngeal exudate.   Eyes: EOM are normal. Pupils are equal, round, and reactive to light.   Neck: Normal range of motion. Neck supple. No JVD present.   Cardiovascular: Normal rate, regular rhythm, normal heart sounds and intact distal pulses.   Pulmonary/Chest: Breath sounds normal. No stridor. No respiratory distress. She has no wheezes. She has no rhonchi. She has no rales. She exhibits no tenderness.   Abdominal: Soft. Bowel sounds are normal. She exhibits no distension and no mass. There is no tenderness. There is no rebound and no guarding.   Musculoskeletal: Normal range of motion. She exhibits no edema or tenderness.   Neurological: She is alert and oriented to person, place, and time. She has normal strength. No sensory deficit.   Skin: Skin is warm and dry.   Psychiatric: She has a normal mood and affect. Thought content normal.         ED Course   Procedures  Labs Reviewed   BASIC METABOLIC PANEL - Abnormal; Notable for the following components:       Result Value    Potassium 3.2 (*)     Chloride 111 (*)     CO2 21 (*)     All other components within normal limits   TROPONIN I - Abnormal; Notable for the following components:    Troponin I 0.031 (*)     All other components within normal  limits   CBC W/ AUTO DIFFERENTIAL - Abnormal; Notable for the following components:    RBC 3.72 (*)     Hemoglobin 11.5 (*)     Hematocrit 35.0 (*)     RDW 15.8 (*)     MPV 13.5 (*)     All other components within normal limits   PROTIME-INR - Abnormal; Notable for the following components:    Prothrombin Time 15.2 (*)     INR 1.5 (*)     All other components within normal limits   D DIMER, QUANTITATIVE - Abnormal; Notable for the following components:    D-Dimer 0.70 (*)     All other components within normal limits     EKG Readings: (Independently Interpreted)   Initial Reading: No STEMI. Rhythm: Normal Sinus Rhythm. Ectopy: No Ectopy. Conduction: Normal. ST Segments: Normal ST Segments. T Waves: Normal. Clinical Impression: Normal Sinus Rhythm       Imaging Results          X-Ray Chest 1 View (Final result)  Result time 10/27/18 15:36:39    Final result by Yeyo Ayers MD (10/27/18 15:36:39)                 Impression:      Enlarged cardiac silhouette without evidence of failure.    Left costophrenic angle and hemidiaphragm not well visualized which may be related to artifact versus small pleural effusion and/or atelectasis/infiltrate.      Electronically signed by: Yeyo Ayers MD  Date:    10/27/2018  Time:    15:36             Narrative:    EXAMINATION:  XR CHEST 1 VIEW    CLINICAL HISTORY:  Abnormal levels of other serum enzymes    TECHNIQUE:  Single portable AP upright view of the chest was performed.    COMPARISON:  None    FINDINGS:  Monitoring leads overlie the chest.  Trachea is midline.  Cardiac silhouette is moderately enlarged without convincing evidence of failure.  Mediastinal contours are within normal limits.  Pulmonary vasculature and hilar regions are within normal limits.  Left costophrenic angle and hemidiaphragm are not well visualized which may be related to artifact from underpenetration versus small pleural effusion, with underlying atelectasis/infiltrate not excluded.  Right hemithorax  is clear.  No pneumothorax.  Included osseous structures appear intact.  PA and lateral views can be obtained.                                 Medical Decision Making:   History:   Old Medical Records: I decided to obtain old medical records.  Initial Assessment:   Medical decision-making:    The patient received a medical screening exam. If performed, the EKG was independently evaluated by me and is pending final cardiology evaluation.  If performed, all radiographic studies were independently evaluated by me and are pending final radiology evaluation. If labs were ordered, they were reviewed. Vital signs are independently assessed by me.  If performed, the pulse oximetry was independently evaluated by me.  I decided to obtain the patient's past medical record.  If available, I reviewed the patient's past medical record, including most recent labs and radiology reports.    ED Management:  Pt with elevated BP measurements at home. Complains of leg swelling and intermittent throat and chest pain. Pt is chest pain free at this time.  EKG without acute ischemia. Pt does however have an elevated troponin. Will need to rule out ACS. May be some demand ischemia from CHF. Will diurese.  Will check a D-dimer for lower extremity DVT/pulmonary embolism as the cause of the elevated troponin.  Patient is not in any respiratory distress at this time.    I discussed the patient's presentation and workup with the hospitalist who agrees with placing the patient in the hospital.  I have placed orders for the hospitalist.   The results and physical exam findings were reviewed with the patient. Pt agrees with assessment, disposition and treatment plan and has no further questions or complaints at this time.    LUKE Mireles M.D. 3:12 PM 10/27/2018    Ddimer elevated. Will rule out PE and lower ext DVT.  LUKE Mireles M.D. 3:59 PM 10/27/2018                Scribe Attestation:   Scribe #1: I performed the above scribed service  and the documentation accurately describes the services I performed. I attest to the accuracy of the note.    Attending Attestation:           Physician Attestation for Scribe:  Physician Attestation Statement for Scribe #1: I, Jean Mireles MD, reviewed documentation, as scribed by Marta Mejia in my presence, and it is both accurate and complete.                    Clinical Impression:   The primary encounter diagnosis was Hypertension, unspecified type. Diagnoses of Fluid retention, Elevated troponin, Elevated d-dimer, and Leg swelling were also pertinent to this visit.                             Jean Mireles MD  10/27/18 1512       Jean Mireles MD  10/27/18 1600

## 2018-10-28 PROBLEM — E03.8 OTHER SPECIFIED HYPOTHYROIDISM: Status: ACTIVE | Noted: 2018-10-28

## 2018-10-28 PROBLEM — D64.9 NORMOCYTIC ANEMIA: Status: ACTIVE | Noted: 2018-10-28

## 2018-10-28 PROBLEM — R79.89 ELEVATED TROPONIN: Status: ACTIVE | Noted: 2018-10-28

## 2018-10-28 PROBLEM — R94.31 ST SEGMENT DEPRESSION: Status: ACTIVE | Noted: 2018-10-28

## 2018-10-28 PROBLEM — R60.0 BILATERAL LEG EDEMA: Status: ACTIVE | Noted: 2018-10-28

## 2018-10-28 PROBLEM — I16.1 HYPERTENSIVE EMERGENCY: Status: ACTIVE | Noted: 2018-10-28

## 2018-10-28 PROBLEM — R79.1 ELEVATED INR: Status: ACTIVE | Noted: 2018-10-28

## 2018-10-28 LAB
ANION GAP SERPL CALC-SCNC: 6 MMOL/L
BUN SERPL-MCNC: 10 MG/DL
CALCIUM SERPL-MCNC: 8.5 MG/DL
CHLORIDE SERPL-SCNC: 111 MMOL/L
CO2 SERPL-SCNC: 25 MMOL/L
CREAT SERPL-MCNC: 0.9 MG/DL
EST. GFR  (AFRICAN AMERICAN): >60 ML/MIN/1.73 M^2
EST. GFR  (NON AFRICAN AMERICAN): >60 ML/MIN/1.73 M^2
GLUCOSE SERPL-MCNC: 90 MG/DL
POTASSIUM SERPL-SCNC: 4.2 MMOL/L
SODIUM SERPL-SCNC: 142 MMOL/L
T4 FREE SERPL-MCNC: 0.72 NG/DL
TROPONIN I SERPL DL<=0.01 NG/ML-MCNC: 0.03 NG/ML
TROPONIN I SERPL DL<=0.01 NG/ML-MCNC: 0.03 NG/ML
TSH SERPL DL<=0.005 MIU/L-ACNC: 21.87 UIU/ML

## 2018-10-28 PROCEDURE — 96376 TX/PRO/DX INJ SAME DRUG ADON: CPT | Performed by: EMERGENCY MEDICINE

## 2018-10-28 PROCEDURE — 84439 ASSAY OF FREE THYROXINE: CPT

## 2018-10-28 PROCEDURE — 84480 ASSAY TRIIODOTHYRONINE (T3): CPT

## 2018-10-28 PROCEDURE — G0378 HOSPITAL OBSERVATION PER HR: HCPCS

## 2018-10-28 PROCEDURE — 80048 BASIC METABOLIC PNL TOTAL CA: CPT

## 2018-10-28 PROCEDURE — 84443 ASSAY THYROID STIM HORMONE: CPT

## 2018-10-28 PROCEDURE — 36415 COLL VENOUS BLD VENIPUNCTURE: CPT

## 2018-10-28 PROCEDURE — 63600175 PHARM REV CODE 636 W HCPCS: Performed by: EMERGENCY MEDICINE

## 2018-10-28 PROCEDURE — 25000003 PHARM REV CODE 250: Performed by: EMERGENCY MEDICINE

## 2018-10-28 PROCEDURE — 84484 ASSAY OF TROPONIN QUANT: CPT

## 2018-10-28 PROCEDURE — 94761 N-INVAS EAR/PLS OXIMETRY MLT: CPT

## 2018-10-28 RX ADMIN — LEVOTHYROXINE SODIUM 75 MCG: 75 TABLET ORAL at 06:10

## 2018-10-28 RX ADMIN — ASPIRIN 325 MG: 325 TABLET, DELAYED RELEASE ORAL at 09:10

## 2018-10-28 RX ADMIN — FUROSEMIDE 20 MG: 10 INJECTION, SOLUTION INTRAMUSCULAR; INTRAVENOUS at 08:10

## 2018-10-28 RX ADMIN — PROPRANOLOL HYDROCHLORIDE 20 MG: 10 TABLET ORAL at 09:10

## 2018-10-28 RX ADMIN — PROPRANOLOL HYDROCHLORIDE 20 MG: 10 TABLET ORAL at 04:10

## 2018-10-28 RX ADMIN — PROPRANOLOL HYDROCHLORIDE 20 MG: 10 TABLET ORAL at 10:10

## 2018-10-28 NOTE — NURSING
New admit from ED. Oriented to room, equipment, plan of care. Pt verbalized understanding. All questions answered. Fall risks reviewed. SR up x 2. Call light in reach.

## 2018-10-28 NOTE — H&P
[facility]  History & Physical    SUBJECTIVE:     Chief Complaint/Reason for Admission: Hypertensive emergency     History of Present Illness:  Mrs. Whitaker 48 YO lady with most recent onset of Graves dis leading to hypothyroidism. Pt was started on Synthroid and added cytomel most recently. Has been having bilateral LE edema and elevated bp. Pt came [facility] ED for further evaluation. Work up revealed slightly elevated bp and abnormal EKG. Pt dose significant chest pain or sob. Valentino LE edema has been improving. Family also reports changes in memory.   Pt endorsed compliance with all medications.     PTA Medications   Medication Sig    levothyroxine (SYNTHROID) 75 MCG tablet Take 1 tablet (75 mcg total) by mouth once daily.    propranolol (INDERAL) 20 MG tablet Take 1 tablet (20 mg total) by mouth 2 (two) times daily.    liothyronine (CYTOMEL) 5 MCG Tab Take 2 tablets (10 mcg total) by mouth once daily.    propranolol (INDERAL) 20 MG tablet TAKE 1 TABLET (20 MG TOTAL) BY MOUTH 3 (THREE) TIMES DAILY.       Review of patient's allergies indicates:  Not on File    Past Medical History:   Diagnosis Date    Abnormal Pap smear     Depression     Hypertension 10/27/2018    Thyroid disease      Past Surgical History:   Procedure Laterality Date    COLONOSCOPY N/A 11/3/2017    Procedure: COLONOSCOPY;  Surgeon: Leighton Ross MD;  Location: [facility] ENDO;  Service: Endoscopy;  Laterality: N/A;    COLONOSCOPY N/A 11/3/2017    Performed by Leighton Ross MD at [facility] ENDO    SALPINGECTOMY       Family History   Problem Relation Age of Onset    Colon cancer Mother     Hypertension Mother     No Known Problems Father     Cancer Sister     No Known Problems Brother     Cancer Maternal Aunt     Heart murmur Maternal Uncle      Social History     Tobacco Use    Smoking status: Never Smoker   Substance Use Topics    Alcohol use: No    Drug use: No        Review of Systems:    Constitutional:  Denies fever or chills  Eyes: no visual changes   ENT: no nasal congestion. Denies sore throat with odynophagia   Respiratory: No cough, SOB, exertional dyspnea or  hemoptysis   Cardiovascular: see HPI  Gastrointestinal: no nausea, vomiting or abdominal pain.   Hematologic/Lymphatic: No lymphadenopathy, no significant fatigue, fever or night sweat. No mucocutaneous bleeding   Musculoskeletal: No ROM abnormalities or muscle weakness   Neurological: no seizures or tremors, gait or balance prblems  Skin: no skin lesions  Psych: no mood issues.     OBJECTIVE:     Vital Signs (Most Recent):  Temp: 97.6 °F (36.4 °C) (10/28/18 1222)  Pulse: 62 (10/28/18 1222)  Resp: 16 (10/28/18 1222)  BP: 127/86 (10/28/18 1222)  SpO2: 96 % (10/28/18 1222)    Physical Exam:    General: NAD. Resting in bed. Family in the room   Head: normocephalic, atraumatic   Eyes: conjunctivae pink, anicteric sclera.   Throat: No erythema or post nasal discharge   Neck: supple, no LAD   Lungs: scattered rhonchi and crackles at the bases   Heart: S1, S2, RRR   Abdomen: + BS x 4 quadrants, soft, non tender..   Extremities: pitting edema of LE bilaterally. Pressure stocking in placem   Skin: turgor normal, no erythema or rashes. No abnormal moles  MS: move all extremities    Psy: pleasant, affect is congruent to mood     Laboratory:  CBC:   Recent Labs   Lab 10/27/18  1405   WBC 5.22   RBC 3.72*   HGB 11.5*   HCT 35.0*      MCV 94   MCH 30.9   MCHC 32.9     CMP:   Recent Labs   Lab 10/28/18  0631   GLU 90   CALCIUM 8.5*      K 4.2   CO2 25   *   BUN 10   CREATININE 0.9     Coagulation:   Recent Labs   Lab 10/27/18  1405   LABPROT 15.2*   INR 1.5*     Cardiac markers:   Recent Labs   Lab 10/28/18  1239   TROPONINI 0.033*     ABGs: No results for input(s): PH, PCO2, PO2, HCO3, POCSATURATED, BE in the last 168 hours.  Microbiology Results (last 7 days)     ** No results found for the last 168 hours. **        Specimen (12h ago, onward)     None        No results for input(s): COLORU, CLARITYU, SPECGRAV, PHUR, PROTEINUA, GLUCOSEU, BILIRUBINCON, BLOODU, WBCU, RBCU, BACTERIA, MUCUS, NITRITE, LEUKOCYTESUR, UROBILINOGEN, HYALINECASTS in the last 168 hours.    Diagnostic Results:    Current Facility-Administered Medications   Medication Dose Route Frequency Provider Last Rate Last Dose    aspirin EC tablet 325 mg  325 mg Oral Daily Jean Mireles MD   325 mg at 10/28/18 0900    cloNIDine tablet 0.2 mg  0.2 mg Oral Q4H PRN Jean Mireles MD        furosemide injection 20 mg  20 mg Intravenous BID Jean Mireles MD   20 mg at 10/28/18 0813    influenza (FLUZONE QUADRIVALENT) vaccine 0.5 mL  0.5 mL Intramuscular vaccine x 1 dose Jean Mireles MD        levothyroxine tablet 75 mcg  75 mcg Oral Daily Jean Mireles MD   75 mcg at 10/28/18 0639    liothyronine tablet 10 mcg  10 mcg Oral Daily Jean Mireles MD        propranolol tablet 20 mg  20 mg Oral TID Jean Mireles MD   20 mg at 10/28/18 0937         ASSESSMENT/PLAN:     Active Hospital Problems    Diagnosis  POA    Hypertensive emergency [I16.1]  - with EKG changes and + trop  - pt chest pain free  - bp improving  - CTA without PE  Yes    Elevated troponin [R74.8]  - mid  - may need out pt isch work up    Yes    ST segment depression [R94.31]  - ? Due to bp  - EKG in the am and compare    Yes    Other specified hypothyroidism [E03.8]  - thyroid panel  - pt to continue syn and cytomel       Yes    Elevated INR [R79.1]  - no bleeding  ? Hypoproteinemia     Yes    Normocytic anemia [D64.9]  Yes    Bilateral leg edema [R60.0]  -   Yes    Hypertension [I10]  Yes      Resolved Hospital Problems   No resolved problems to display.     DVT pro: Lovenox     Updated pt and family on the plan of care    Fabián Chisholm M.D  Internal Medicine & Geriatric Medicine  Hematology & Oncology  Palliative Medicine    [address], [address]  [place]  [address]  [phone] (Office)  [phone] (Fax)

## 2018-10-28 NOTE — PLAN OF CARE
Problem: Patient Care Overview  Goal: Plan of Care Review   10/27/18 2031   Coping/Psychosocial   Plan Of Care Reviewed With patient      10/27/18 2031   Coping/Psychosocial   Plan Of Care Reviewed With patient;spouse;sibling

## 2018-10-28 NOTE — PLAN OF CARE
Problem: Patient Care Overview  Goal: Plan of Care Review  Outcome: Ongoing (interventions implemented as appropriate)   10/28/18 9388   Coping/Psychosocial   Plan Of Care Reviewed With patient

## 2018-10-29 LAB
ALLENS TEST: ABNORMAL
AMORPH CRY URNS QL MICRO: ABNORMAL
ANION GAP SERPL CALC-SCNC: 8 MMOL/L
AORTIC ROOT ANNULUS: 2.92 CM
AORTIC VALVE CUSP SEPERATION: 2.01 CM
ASCENDING AORTA: 2.71 CM
AV MEAN GRADIENT: 2.26 MMHG
AV PEAK GRADIENT: 3.66 MMHG
AV VALVE AREA: 1.71 CM2
BACTERIA #/AREA URNS HPF: ABNORMAL /HPF
BILIRUB UR QL STRIP: ABNORMAL
BSA FOR ECHO PROCEDURE: 1.68 M2
BUN SERPL-MCNC: 10 MG/DL
CALCIUM SERPL-MCNC: 8.7 MG/DL
CHLORIDE SERPL-SCNC: 107 MMOL/L
CLARITY UR: CLEAR
CO2 SERPL-SCNC: 24 MMOL/L
COLOR UR: ABNORMAL
CREAT SERPL-MCNC: 0.8 MG/DL
CREAT UR-MCNC: 390.9 MG/DL
CV ECHO LV RWT: 0.35 CM
DELSYS: ABNORMAL
DOP CALC AO PEAK VEL: 0.96 M/S
DOP CALC AO VTI: 16.56 CM
DOP CALC LVOT AREA: 3.43 CM2
DOP CALC LVOT DIAMETER: 2.09 CM
DOP CALC LVOT STROKE VOLUME: 28.29 CM3
DOP CALCLVOT PEAK VEL VTI: 8.25 CM
E WAVE DECELERATION TIME: 126.22 MSEC
E/A RATIO: 6.37
ECHO LV POSTERIOR WALL: 1 CM (ref 0.6–1.1)
EST. GFR  (AFRICAN AMERICAN): >60 ML/MIN/1.73 M^2
EST. GFR  (NON AFRICAN AMERICAN): >60 ML/MIN/1.73 M^2
FRACTIONAL SHORTENING: 10 % (ref 28–44)
GLUCOSE SERPL-MCNC: 89 MG/DL
GLUCOSE UR QL STRIP: NEGATIVE
HCO3 UR-SCNC: 23.4 MMOL/L (ref 24–28)
HGB UR QL STRIP: NEGATIVE
HYALINE CASTS #/AREA URNS LPF: 2 /LPF
INTERVENTRICULAR SEPTUM: 1.12 CM (ref 0.6–1.1)
IVRT: 0.09 MSEC
KETONES UR QL STRIP: NEGATIVE
LA MAJOR: 5.69 CM
LA MINOR: 5.24 CM
LA WIDTH: 4.22 CM
LEFT ATRIUM SIZE: 4.1 CM
LEFT ATRIUM VOLUME INDEX: 47.8 ML/M2
LEFT ATRIUM VOLUME: 80.24 CM3
LEFT INTERNAL DIMENSION IN SYSTOLE: 5.5 CM (ref 2.1–4)
LEFT VENTRICLE DIASTOLIC VOLUME INDEX: 110.76 ML/M2
LEFT VENTRICLE DIASTOLIC VOLUME: 186.08 ML
LEFT VENTRICLE MASS INDEX: 162.6 G/M2
LEFT VENTRICLE SYSTOLIC VOLUME INDEX: 87.7 ML/M2
LEFT VENTRICLE SYSTOLIC VOLUME: 147.4 ML
LEFT VENTRICULAR INTERNAL DIMENSION IN DIASTOLE: 6.09 CM (ref 3.5–6)
LEFT VENTRICULAR MASS: 273.1 G
LEUKOCYTE ESTERASE UR QL STRIP: NEGATIVE
MICROSCOPIC COMMENT: ABNORMAL
MODE: ABNORMAL
MV PEAK A VEL: 0.19 M/S
MV PEAK E VEL: 1.21 M/S
NITRITE UR QL STRIP: NEGATIVE
PCO2 BLDA: 34.9 MMHG (ref 35–45)
PH SMN: 7.43 [PH] (ref 7.35–7.45)
PH UR STRIP: 5 [PH] (ref 5–8)
PISA TR MAX VEL: 2.8 M/S
PO2 BLDA: 74 MMHG (ref 80–100)
POC BE: 0 MMOL/L
POC SATURATED O2: 95 % (ref 95–100)
POC TCO2: 24 MMOL/L (ref 23–27)
POTASSIUM SERPL-SCNC: 3.3 MMOL/L
PROT UR QL STRIP: ABNORMAL
PROT UR-MCNC: 685 MG/DL
PROT/CREAT UR: 1.75 MG/G{CREAT}
PULM VEIN S/D RATIO: 0.36
PV PEAK D VEL: 0.7 M/S
PV PEAK GRADIENT: 1.49 MMHG
PV PEAK S VEL: 0.25 M/S
PV PEAK VELOCITY: 0.61 CM/S
RA MAJOR: 6.2 CM
RA PRESSURE: 8 MMHG
RA WIDTH: 3.88 CM
RBC #/AREA URNS HPF: 3 /HPF (ref 0–4)
RETIRED EF AND QEF - SEE NOTES: 20.79 %
RIGHT VENTRICULAR END-DIASTOLIC DIMENSION: 4.5 CM
SAMPLE: ABNORMAL
SINUS: 2.47 CM
SITE: ABNORMAL
SODIUM SERPL-SCNC: 139 MMOL/L
SP GR UR STRIP: >1.03 (ref 1–1.03)
SQUAMOUS #/AREA URNS HPF: 5 /HPF
STJ: 2.34 CM
T3 SERPL-MCNC: 100 NG/DL
TR MAX PG: 31.36 MMHG
TRICUSPID ANNULAR PLANE SYSTOLIC EXCURSION: 1.4 CM
TV REST PULMONARY ARTERY PRESSURE: 39.36 MMHG
URN SPEC COLLECT METH UR: ABNORMAL
UROBILINOGEN UR STRIP-ACNC: ABNORMAL EU/DL
WBC #/AREA URNS HPF: 3 /HPF (ref 0–5)

## 2018-10-29 PROCEDURE — 84156 ASSAY OF PROTEIN URINE: CPT

## 2018-10-29 PROCEDURE — 25000003 PHARM REV CODE 250: Performed by: INTERNAL MEDICINE

## 2018-10-29 PROCEDURE — 63600175 PHARM REV CODE 636 W HCPCS: Performed by: EMERGENCY MEDICINE

## 2018-10-29 PROCEDURE — 82803 BLOOD GASES ANY COMBINATION: CPT

## 2018-10-29 PROCEDURE — 36600 WITHDRAWAL OF ARTERIAL BLOOD: CPT

## 2018-10-29 PROCEDURE — 93005 ELECTROCARDIOGRAM TRACING: CPT

## 2018-10-29 PROCEDURE — 93010 ELECTROCARDIOGRAM REPORT: CPT | Mod: ,,, | Performed by: INTERNAL MEDICINE

## 2018-10-29 PROCEDURE — 36415 COLL VENOUS BLD VENIPUNCTURE: CPT

## 2018-10-29 PROCEDURE — 96372 THER/PROPH/DIAG INJ SC/IM: CPT | Performed by: EMERGENCY MEDICINE

## 2018-10-29 PROCEDURE — 96376 TX/PRO/DX INJ SAME DRUG ADON: CPT | Performed by: EMERGENCY MEDICINE

## 2018-10-29 PROCEDURE — 21400001 HC TELEMETRY ROOM

## 2018-10-29 PROCEDURE — 81000 URINALYSIS NONAUTO W/SCOPE: CPT

## 2018-10-29 PROCEDURE — 94761 N-INVAS EAR/PLS OXIMETRY MLT: CPT

## 2018-10-29 PROCEDURE — 99900035 HC TECH TIME PER 15 MIN (STAT)

## 2018-10-29 PROCEDURE — 25000003 PHARM REV CODE 250: Performed by: EMERGENCY MEDICINE

## 2018-10-29 PROCEDURE — 80048 BASIC METABOLIC PNL TOTAL CA: CPT

## 2018-10-29 PROCEDURE — 63600175 PHARM REV CODE 636 W HCPCS: Performed by: INTERNAL MEDICINE

## 2018-10-29 RX ORDER — ENOXAPARIN SODIUM 100 MG/ML
40 INJECTION SUBCUTANEOUS EVERY 24 HOURS
Status: DISCONTINUED | OUTPATIENT
Start: 2018-10-29 | End: 2018-10-31 | Stop reason: HOSPADM

## 2018-10-29 RX ORDER — LOSARTAN POTASSIUM 25 MG/1
25 TABLET ORAL DAILY
Status: DISCONTINUED | OUTPATIENT
Start: 2018-10-30 | End: 2018-10-31

## 2018-10-29 RX ORDER — OXYCODONE AND ACETAMINOPHEN 5; 325 MG/1; MG/1
1 TABLET ORAL EVERY 6 HOURS PRN
Status: DISCONTINUED | OUTPATIENT
Start: 2018-10-29 | End: 2018-10-31 | Stop reason: HOSPADM

## 2018-10-29 RX ADMIN — PROPRANOLOL HYDROCHLORIDE 20 MG: 10 TABLET ORAL at 09:10

## 2018-10-29 RX ADMIN — LEVOTHYROXINE SODIUM 75 MCG: 75 TABLET ORAL at 07:10

## 2018-10-29 RX ADMIN — FUROSEMIDE 20 MG: 10 INJECTION, SOLUTION INTRAMUSCULAR; INTRAVENOUS at 08:10

## 2018-10-29 RX ADMIN — FUROSEMIDE 20 MG: 10 INJECTION, SOLUTION INTRAMUSCULAR; INTRAVENOUS at 05:10

## 2018-10-29 RX ADMIN — ENOXAPARIN SODIUM 40 MG: 100 INJECTION SUBCUTANEOUS at 02:10

## 2018-10-29 RX ADMIN — PROPRANOLOL HYDROCHLORIDE 20 MG: 10 TABLET ORAL at 02:10

## 2018-10-29 RX ADMIN — ASPIRIN 325 MG: 325 TABLET, DELAYED RELEASE ORAL at 08:10

## 2018-10-29 RX ADMIN — LIOTHYRONINE SODIUM 10 MCG: 5 TABLET ORAL at 08:10

## 2018-10-29 RX ADMIN — OXYCODONE HYDROCHLORIDE AND ACETAMINOPHEN 1 TABLET: 5; 325 TABLET ORAL at 09:10

## 2018-10-29 RX ADMIN — PROPRANOLOL HYDROCHLORIDE 20 MG: 10 TABLET ORAL at 08:10

## 2018-10-29 NOTE — PROGRESS NOTES
Progress Note    Admit Date: 10/27/2018   LOS: 0 days     SUBJECTIVE:     Mrs. Whitaker 46 YO lady with most recent onset of Graves dis leading to hypothyroidism. Pt was started on Synthroid and added cytomel most recently. Has been having bilateral LE edema and elevated bp. Pt came to [facility] ED for further evaluation. Work up revealed slightly elevated bp and abnormal EKG. Pt dose significant chest pain or sob. Valentino LE edema has been improving. Family also reports changes in memory.   Pt endorsed compliance with all medications.     Follow-up For:      10/29/18: Denies any chest pain, sob. Valentino LE edema improving. BP fluctuates         Scheduled Meds:   aspirin  325 mg Oral Daily    furosemide  20 mg Intravenous BID    levothyroxine  75 mcg Oral Daily    liothyronine  10 mcg Oral Daily    propranolol  20 mg Oral TID     Continuous Infusions:  PRN Meds:cloNIDine, influenza    Review of patient's allergies indicates:  Not on File    Review of Systems    Constitutional: Denies fever or chills  Eyes: no visual changes   ENT: no nasal congestion. Denies sore throat with odynophagia   Respiratory: No cough, SOB, exertional dyspnea or  hemoptysis   Cardiovascular: see HPI  Gastrointestinal: no nausea, vomiting or abdominal pain.   Hematologic/Lymphatic: No lymphadenopathy, no significant fatigue, fever or night sweat. No mucocutaneous bleeding   Musculoskeletal: No ROM abnormalities or muscle weakness   Neurological: no seizures or tremors, gait or balance prblems  Skin: no skin lesions  Psych: no mood issues.       OBJECTIVE:     Vital Signs (Most Recent)  Temp: 97.6 °F (36.4 °C) (10/29/18 0757)  Pulse: 62 (10/29/18 0757)  Resp: 18 (10/29/18 0757)  BP: (!) 145/106 (10/29/18 0757)  SpO2: 99 % (10/29/18 0757)    Vital Signs Range (Last 24H):  Temp:  [96.9 °F (36.1 °C)-98 °F (36.7 °C)]   Pulse:  [59-70]   Resp:  [16-18]   BP: (121-157)/()   SpO2:  [96 %-100 %]     I & O (Last 24H):    Intake/Output Summary (Last 24  hours) at 10/29/2018 0859  Last data filed at 10/28/2018 1600  Gross per 24 hour   Intake 300 ml   Output 150 ml   Net 150 ml     Physical Exam:    General: NAD. Resting in bed. Daughter at the bedside   Head: normocephalic, atraumatic   Eyes: conjunctivae pink, anicteric sclera.   Throat: No erythema or post nasal discharge   Neck: supple, no LAD   Lungs: scattered rhonchi and crackles at the bases   Heart: S1, S2, RRR   Abdomen: + BS x 4 quadrants, soft, non tender..   Extremities: pitting edema of LE bilaterally. Pressure stocking in placem   Skin: turgor normal  MS: move all extremities    Psy: pleasant, affect is congruent to mood       Laboratory:  CBC:   Recent Labs   Lab 10/27/18  1405   WBC 5.22   RBC 3.72*   HGB 11.5*   HCT 35.0*      MCV 94   MCH 30.9   MCHC 32.9     CMP:   Recent Labs   Lab 10/29/18  0641   GLU 89   CALCIUM 8.7      K 3.3*   CO2 24      BUN 10   CREATININE 0.8     Coagulation:   Recent Labs   Lab 10/27/18  1405   LABPROT 15.2*   INR 1.5*     Cardiac markers:   Recent Labs   Lab 10/28/18  1239   TROPONINI 0.033*     ABGs: No results for input(s): PH, PCO2, PO2, HCO3, POCSATURATED, BE in the last 168 hours.  Microbiology Results (last 7 days)     ** No results found for the last 168 hours. **        Specimen (12h ago, onward)    None        No results for input(s): COLORU, CLARITYU, SPECGRAV, PHUR, PROTEINUA, GLUCOSEU, BILIRUBINCON, BLOODU, WBCU, RBCU, BACTERIA, MUCUS, NITRITE, LEUKOCYTESUR, UROBILINOGEN, HYALINECASTS in the last 168 hours.    Diagnostic Results:    Current Facility-Administered Medications   Medication Dose Route Frequency Provider Last Rate Last Dose    aspirin EC tablet 325 mg  325 mg Oral Daily Jean Mireles MD   325 mg at 10/29/18 0851    cloNIDine tablet 0.2 mg  0.2 mg Oral Q4H PRN Jean Mireles MD        furosemide injection 20 mg  20 mg Intravenous BID Jean Mireles MD   20 mg at 10/29/18 0850    influenza (FLUZONE  QUADRIVALENT) vaccine 0.5 mL  0.5 mL Intramuscular vaccine x 1 dose Jean Mireles MD        levothyroxine tablet 75 mcg  75 mcg Oral Daily Jean Mireles MD   75 mcg at 10/29/18 0706    liothyronine tablet 10 mcg  10 mcg Oral Daily Jean Mireles MD   10 mcg at 10/29/18 0850    propranolol tablet 20 mg  20 mg Oral TID Jean Mireles MD   20 mg at 10/29/18 0851         ASSESSMENT/PLAN:       Active Hospital Problems     Diagnosis   POA    Hypertensive emergency [I16.1]  - with EKG changes and + trop  - pt chest pain free now   - bp waxing and waning   - CTA without PE   Yes    Elevated troponin [R74.8]  - mild   - may need out pt isch work up  - free of chest pain and sob      Yes    ST segment depression [R94.31]  - ? Due to bp  - EKG in the am and compare  - 2-D Echo today       Yes    Other specified hypothyroidism [E03.8]  - thyroid panel: TSH continue to be high but better  - pt to continue syn and cytomel as ordered           Yes    Elevated INR [R79.1]  - no bleeding  ? Hypoproteinemia  -UA pending        Yes    Normocytic anemia [D64.9]   Yes    Bilateral leg edema [R60.0]  -   Yes    Hypertension [I10]   Yes       Resolved Hospital Problems   No resolved problems to display.      DVT pro: Lovenox      Updated pt and RN about plan of care    Fabián Chisholm M.D  Internal Medicine & Geriatric Medicine  Hematology & Oncology  Palliative Medicine    [address], [address]  [place] [id number] [phone] (Office)  [phone] (Fax)    Reviewed 2-D Echo    · Left ventricle ejection fraction is severely decreased at 15%  · Severe global hypokinetic wall motion.  · The left ventricle cavity is severely dilated.  · Left ventricle shows eccentric hypertrophy.  · Grade III (severe) left ventricular diastolic dysfunction consistent with restrictive physiology.  · Right ventricular cavity size is mildly dilated.  · RV systolic function is moderately reduced.  · Moderate  mitral regurgitation.  · Severe tricuspid regurgitation.  · The estimated PA systolic pressure is 39.36 mm Hg  · Trivial pericardial effusion without hemodynamic compromise    Due to pt's severity of symptoms, presentation and physical exam finding, pt need further work up. Pt has both LV and RV dysfxn- with new onset symptoms. Her mental status- orientation x 1 most of the time- pt may have hypoxia/hypoxemia.   Plan: Cardiology consult for cardiomyopathy evaluation - will defer to cardiology for ischemia work u  - ABG  - Waking desaturation   - pt's thyroid fxn- improving- TSH lagging behind    - check electrolyte status    - repeat EKG if cp/sob    Fabián Chisholm M.D  Internal Medicine & Geriatric Medicine  Hematology & Oncology  Palliative Medicine    [address], [address]  [place] [address] [phone] (Office)  [phone] (Fax)

## 2018-10-29 NOTE — UM SECONDARY REVIEW
Medical Affairs    Level of Care Issue     46 YO lady with most recent onset of Graves dis leading to hypothyroidism. Pt was started on Synthroid and added cytomel most recently. Has been having bilateral LE edema and elevated bp. Pt came to [facility] ED for further evaluation. Work up revealed slightly elevated bp and abnormal EKG. Pt dose significant chest pain or sob. Valentino LE edema has been improving. Family also reports changes in memory.   Pt endorsed compliance with all medications    10/29/18: Denies any chest pain, sob. Valentino LE edema improving. BP fluctuates     echo showed EF 15%. plan on cards consult    TTE:   ·            Left ventricle ejection fraction is severely decreased at 15%  ·            Severe global hypokinetic wall motion.  ·            The left ventricle cavity is severely dilated.  ·            Left ventricle shows eccentric hypertrophy.  ·            Grade III (severe) left ventricular diastolic dysfunction consistent with restrictive physiology.  ·            Right ventricular cavity size is mildly dilated.  ·            RV systolic function is moderately reduced.  ·            Moderate mitral regurgitation.  ·            Severe tricuspid regurgitation.  ·            The estimated PA systolic pressure is 39.36 mm Hg  ·            Trivial pericardial effusion without hemodynamic compromise    meds:    asa, loveox, levothyroxine, liothyronine, propanolol  lasix 20mg IV BID     Approved Inpatient     At first Dr. Rosenthal said keep as obs, but then said to change to IP     Dr. Rosenthal response    CONSIDER (and he might have to do a peer to peer). Its not strong, but if he documents to the severity and not the wellness, it could potentially be acute IP:  Yes. If it is documented correctly, he could detail out the fact that: low EF, new onset mental status changes (r/o infarct - embolic/thrombotic), myocarditis, need for anticoagulation, planning for return to [place] vs SNF, stamina, potential O2  desaturation with ambulation and since this is anew onset cardiopulmonary considtion needs approrpiate post acute planning. A discharge home from OBS would not be safe without complete evaluation of this new onset condition of unknown etiology

## 2018-10-29 NOTE — PLAN OF CARE
Problem: Hypertensive Disease/Crisis (Arterial) (Adult)  Intervention: Cluster Activity/Decrease Environmental Stimulation   10/29/18 1718   Cognitive Interventions   Sensory Stimulation Regulation care clustered;music/television provided for relaxation;quiet environment promoted;tactile stimulation minimized       Goal: Signs and Symptoms of Listed Potential Problems Will be Absent, Minimized or Managed (Hypertensive Disease/Crisis)  Signs and symptoms of listed potential problems will be absent, minimized or managed by discharge/transition of care (reference Hypertensive Disease/Crisis (Arterial) (Adult) CPG).   10/29/18 1718   Hypertensive Disease/Crisis (Arterial)   Problems Assessed (Hypertensive Disease/Crisis (Arterial)) situational response   Problems Present (Hypertensive Disease/Crisis (Arterial)) situational response;chronic vascular complications

## 2018-10-29 NOTE — PLAN OF CARE
To patient's room to discuss patient managing her care at home.      TN Role Explained.  Patient identified by using 2 identifiers:  Name and date of birth    Patient stated that her  WILL HELP AT HOME WITH her RECOVERY.      TN name and contact info placed on the communication board    Preferred Pharmacy:  [id number] - [place] - [address]  [address] [place] [id number]  Phone: [phone] Fax: [phone]       10/29/18 1646   Discharge Assessment   Assessment Type Discharge Planning Assessment   Confirmed/corrected address and phone number on facesheet? Yes   Assessment information obtained from? Patient   Expected Length of Stay (days) 2   Communicated expected length of stay with patient/caregiver yes   Prior to hospitilization cognitive status: Alert/Oriented   Prior to hospitalization functional status: Independent   Current cognitive status: Alert/Oriented   Current Functional Status: Independent   Lives With spouse;child(kevin), dependent   Able to Return to Prior Arrangements yes   Is patient able to care for self after discharge? Yes   Patient's perception of discharge disposition home or selfcare   Readmission Within The Last 30 Days no previous admission in last 30 days   Patient currently being followed by outpatient case management? No   Patient currently receives any other outside agency services? No   Equipment Currently Used at Home none   Do you have any problems affording any of your prescribed medications? No   Is the patient taking medications as prescribed? yes   Does the patient have transportation home? Yes   Transportation Available family or friend will provide   Does the patient receive services at the [facility]? No   Discharge Plan A Home with family   Patient/Family In Agreement With Plan yes

## 2018-10-29 NOTE — PLAN OF CARE
10/28/18 1428   Discharge Assessment   Assessment Type Discharge Planning Assessment   Patient/Family In Agreement With Plan other (see comments)  (Attempted d/c needs assessment)

## 2018-10-29 NOTE — PLAN OF CARE
Problem: Hypertensive Disease/Crisis (Arterial) (Adult)  Intervention: Cluster Activity/Decrease Environmental Stimulation   10/29/18 0257   Cognitive Interventions   Sensory Stimulation Regulation care clustered;quiet environment promoted     Intervention: Promote Rest/Minimize Oxygen Consumption   10/28/18 2300 10/29/18 0257   Coping/Psychosocial Interventions   Environmental Support --  calm environment promoted;caregiver consistency promoted;distractions minimized;environmental consistency promoted;personal routine supported;rest periods encouraged;rooming-in facilitated   Activity   Activity Management ambulated in room* - L4 --      Intervention: Support/Optimize Psychosocial Response to Illness   10/29/18 0257   Coping/Psychosocial Interventions   Supportive Measures active listening utilized;decision-making supported;positive reinforcement provided;relaxation techniques promoted;self-care encouraged;self-responsibility promoted;verbalization of feelings encouraged   Psychosocial Support   Family/Support System Care self-care encouraged     Intervention: Gradually Decrease Blood Pressure   10/29/18 0257   Safety Interventions   Medication Review/Management medications reviewed;high risk medications identified       Goal: Signs and Symptoms of Listed Potential Problems Will be Absent, Minimized or Managed (Hypertensive Disease/Crisis)  Signs and symptoms of listed potential problems will be absent, minimized or managed by discharge/transition of care (reference Hypertensive Disease/Crisis (Arterial) (Adult) CPG).  Outcome: Ongoing (interventions implemented as appropriate)   10/29/18 0257   Hypertensive Disease/Crisis (Arterial)   Problems Assessed (Hypertensive Disease/Crisis (Arterial)) all   Problems Present (Hypertensive Disease/Crisis (Arterial)) severe hypertension/hypertensive crisis

## 2018-10-30 PROBLEM — I42.8 NICM (NONISCHEMIC CARDIOMYOPATHY): Status: ACTIVE | Noted: 2018-10-30

## 2018-10-30 PROBLEM — I50.41 ACUTE COMBINED SYSTOLIC AND DIASTOLIC CONGESTIVE HEART FAILURE: Status: ACTIVE | Noted: 2018-10-30

## 2018-10-30 LAB
ALBUMIN SERPL BCP-MCNC: 3.8 G/DL
ALP SERPL-CCNC: 110 U/L
ALT SERPL W/O P-5'-P-CCNC: 73 U/L
ANION GAP SERPL CALC-SCNC: 12 MMOL/L
ANION GAP SERPL CALC-SCNC: 13 MMOL/L
AST SERPL-CCNC: 51 U/L
BASOPHILS # BLD AUTO: 0.01 K/UL
BASOPHILS NFR BLD: 0.2 %
BILIRUB SERPL-MCNC: 1.7 MG/DL
BUN SERPL-MCNC: 15 MG/DL
BUN SERPL-MCNC: 15 MG/DL
CALCIUM SERPL-MCNC: 8.9 MG/DL
CALCIUM SERPL-MCNC: 9 MG/DL
CHLORIDE SERPL-SCNC: 106 MMOL/L
CHLORIDE SERPL-SCNC: 107 MMOL/L
CO2 SERPL-SCNC: 21 MMOL/L
CO2 SERPL-SCNC: 21 MMOL/L
CREAT SERPL-MCNC: 0.8 MG/DL
CREAT SERPL-MCNC: 0.8 MG/DL
DIFFERENTIAL METHOD: ABNORMAL
EOSINOPHIL # BLD AUTO: 0 K/UL
EOSINOPHIL NFR BLD: 0.7 %
ERYTHROCYTE [DISTWIDTH] IN BLOOD BY AUTOMATED COUNT: 15.8 %
EST. GFR  (AFRICAN AMERICAN): >60 ML/MIN/1.73 M^2
EST. GFR  (AFRICAN AMERICAN): >60 ML/MIN/1.73 M^2
EST. GFR  (NON AFRICAN AMERICAN): >60 ML/MIN/1.73 M^2
EST. GFR  (NON AFRICAN AMERICAN): >60 ML/MIN/1.73 M^2
GLUCOSE SERPL-MCNC: 107 MG/DL
GLUCOSE SERPL-MCNC: 109 MG/DL
HCT VFR BLD AUTO: 34.8 %
HGB BLD-MCNC: 11.6 G/DL
LYMPHOCYTES # BLD AUTO: 1.1 K/UL
LYMPHOCYTES NFR BLD: 24.8 %
MCH RBC QN AUTO: 31.1 PG
MCHC RBC AUTO-ENTMCNC: 33.3 G/DL
MCV RBC AUTO: 93 FL
MONOCYTES # BLD AUTO: 0.6 K/UL
MONOCYTES NFR BLD: 14 %
NEUTROPHILS # BLD AUTO: 2.6 K/UL
NEUTROPHILS NFR BLD: 60.3 %
OHS CV CATH LVEDP PRE: 20
PLATELET # BLD AUTO: 227 K/UL
PMV BLD AUTO: 13 FL
POTASSIUM SERPL-SCNC: 3.7 MMOL/L
POTASSIUM SERPL-SCNC: 3.7 MMOL/L
PROT SERPL-MCNC: 6.6 G/DL
RBC # BLD AUTO: 3.73 M/UL
SODIUM SERPL-SCNC: 140 MMOL/L
SODIUM SERPL-SCNC: 140 MMOL/L
WBC # BLD AUTO: 4.28 K/UL

## 2018-10-30 PROCEDURE — C1894 INTRO/SHEATH, NON-LASER: HCPCS | Performed by: INTERNAL MEDICINE

## 2018-10-30 PROCEDURE — 99153 MOD SED SAME PHYS/QHP EA: CPT | Performed by: INTERNAL MEDICINE

## 2018-10-30 PROCEDURE — 4A133B3 MONITORING OF ARTERIAL PRESSURE, PULMONARY, PERCUTANEOUS APPROACH: ICD-10-PCS | Performed by: INTERNAL MEDICINE

## 2018-10-30 PROCEDURE — 82375 ASSAY CARBOXYHB QUANT: CPT

## 2018-10-30 PROCEDURE — 36415 COLL VENOUS BLD VENIPUNCTURE: CPT

## 2018-10-30 PROCEDURE — C1887 CATHETER, GUIDING: HCPCS | Performed by: INTERNAL MEDICINE

## 2018-10-30 PROCEDURE — 27201423 OPTIME MED/SURG SUP & DEVICES STERILE SUPPLY: Performed by: INTERNAL MEDICINE

## 2018-10-30 PROCEDURE — 93460 R&L HRT ART/VENTRICLE ANGIO: CPT | Mod: 26,,, | Performed by: INTERNAL MEDICINE

## 2018-10-30 PROCEDURE — 94761 N-INVAS EAR/PLS OXIMETRY MLT: CPT

## 2018-10-30 PROCEDURE — 63600175 PHARM REV CODE 636 W HCPCS: Performed by: EMERGENCY MEDICINE

## 2018-10-30 PROCEDURE — 25000003 PHARM REV CODE 250: Performed by: EMERGENCY MEDICINE

## 2018-10-30 PROCEDURE — 4A023N8 MEASUREMENT OF CARDIAC SAMPLING AND PRESSURE, BILATERAL, PERCUTANEOUS APPROACH: ICD-10-PCS | Performed by: INTERNAL MEDICINE

## 2018-10-30 PROCEDURE — 63600175 PHARM REV CODE 636 W HCPCS: Performed by: INTERNAL MEDICINE

## 2018-10-30 PROCEDURE — 80053 COMPREHEN METABOLIC PANEL: CPT

## 2018-10-30 PROCEDURE — 25000003 PHARM REV CODE 250: Performed by: INTERNAL MEDICINE

## 2018-10-30 PROCEDURE — 02HP32Z INSERTION OF MONITORING DEVICE INTO PULMONARY TRUNK, PERCUTANEOUS APPROACH: ICD-10-PCS | Performed by: INTERNAL MEDICINE

## 2018-10-30 PROCEDURE — 99152 MOD SED SAME PHYS/QHP 5/>YRS: CPT | Mod: ,,, | Performed by: INTERNAL MEDICINE

## 2018-10-30 PROCEDURE — 99900035 HC TECH TIME PER 15 MIN (STAT)

## 2018-10-30 PROCEDURE — C1751 CATH, INF, PER/CENT/MIDLINE: HCPCS | Performed by: INTERNAL MEDICINE

## 2018-10-30 PROCEDURE — B2111ZZ FLUOROSCOPY OF MULTIPLE CORONARY ARTERIES USING LOW OSMOLAR CONTRAST: ICD-10-PCS | Performed by: INTERNAL MEDICINE

## 2018-10-30 PROCEDURE — 99152 MOD SED SAME PHYS/QHP 5/>YRS: CPT | Performed by: INTERNAL MEDICINE

## 2018-10-30 PROCEDURE — 85025 COMPLETE CBC W/AUTO DIFF WBC: CPT

## 2018-10-30 PROCEDURE — 80048 BASIC METABOLIC PNL TOTAL CA: CPT

## 2018-10-30 PROCEDURE — B2161ZZ FLUOROSCOPY OF RIGHT AND LEFT HEART USING LOW OSMOLAR CONTRAST: ICD-10-PCS | Performed by: INTERNAL MEDICINE

## 2018-10-30 PROCEDURE — 4A1239Z MONITORING OF CARDIAC OUTPUT, PERCUTANEOUS APPROACH: ICD-10-PCS | Performed by: INTERNAL MEDICINE

## 2018-10-30 PROCEDURE — 21400001 HC TELEMETRY ROOM

## 2018-10-30 PROCEDURE — 99255 IP/OBS CONSLTJ NEW/EST HI 80: CPT | Mod: 25,,, | Performed by: INTERNAL MEDICINE

## 2018-10-30 PROCEDURE — 25500020 PHARM REV CODE 255: Performed by: INTERNAL MEDICINE

## 2018-10-30 PROCEDURE — 93460 R&L HRT ART/VENTRICLE ANGIO: CPT | Performed by: INTERNAL MEDICINE

## 2018-10-30 PROCEDURE — C1769 GUIDE WIRE: HCPCS | Performed by: INTERNAL MEDICINE

## 2018-10-30 RX ORDER — MIDAZOLAM HYDROCHLORIDE 1 MG/ML
INJECTION, SOLUTION INTRAMUSCULAR; INTRAVENOUS
Status: DISCONTINUED | OUTPATIENT
Start: 2018-10-30 | End: 2018-10-30

## 2018-10-30 RX ORDER — HYDROCODONE BITARTRATE AND ACETAMINOPHEN 5; 325 MG/1; MG/1
1 TABLET ORAL EVERY 4 HOURS PRN
Status: DISCONTINUED | OUTPATIENT
Start: 2018-10-30 | End: 2018-10-31 | Stop reason: HOSPADM

## 2018-10-30 RX ORDER — MORPHINE SULFATE 4 MG/ML
2 INJECTION, SOLUTION INTRAMUSCULAR; INTRAVENOUS EVERY 10 MIN PRN
Status: DISCONTINUED | OUTPATIENT
Start: 2018-10-30 | End: 2018-10-31 | Stop reason: HOSPADM

## 2018-10-30 RX ORDER — CARVEDILOL 3.12 MG/1
3.12 TABLET ORAL 2 TIMES DAILY
Status: DISCONTINUED | OUTPATIENT
Start: 2018-10-30 | End: 2018-10-31 | Stop reason: HOSPADM

## 2018-10-30 RX ORDER — MORPHINE SULFATE 10 MG/ML
2 INJECTION INTRAMUSCULAR; INTRAVENOUS; SUBCUTANEOUS EVERY 10 MIN PRN
Status: DISCONTINUED | OUTPATIENT
Start: 2018-10-30 | End: 2018-10-30

## 2018-10-30 RX ORDER — LIDOCAINE HYDROCHLORIDE 5 MG/ML
INJECTION, SOLUTION INFILTRATION; INTRAVENOUS
Status: DISCONTINUED | OUTPATIENT
Start: 2018-10-30 | End: 2018-10-30

## 2018-10-30 RX ORDER — SODIUM CHLORIDE 9 MG/ML
INJECTION, SOLUTION INTRAVENOUS CONTINUOUS
Status: DISCONTINUED | OUTPATIENT
Start: 2018-10-30 | End: 2018-10-30

## 2018-10-30 RX ORDER — ASPIRIN 81 MG/1
81 TABLET ORAL DAILY
Status: DISCONTINUED | OUTPATIENT
Start: 2018-10-30 | End: 2018-10-31 | Stop reason: HOSPADM

## 2018-10-30 RX ORDER — ATROPINE SULFATE 0.1 MG/ML
0.5 INJECTION INTRAVENOUS
Status: DISCONTINUED | OUTPATIENT
Start: 2018-10-30 | End: 2018-10-31 | Stop reason: HOSPADM

## 2018-10-30 RX ORDER — ACETAMINOPHEN 325 MG/1
650 TABLET ORAL EVERY 4 HOURS PRN
Status: DISCONTINUED | OUTPATIENT
Start: 2018-10-30 | End: 2018-10-31 | Stop reason: HOSPADM

## 2018-10-30 RX ORDER — FENTANYL CITRATE 50 UG/ML
INJECTION, SOLUTION INTRAMUSCULAR; INTRAVENOUS
Status: DISCONTINUED | OUTPATIENT
Start: 2018-10-30 | End: 2018-10-30

## 2018-10-30 RX ORDER — ONDANSETRON 2 MG/ML
4 INJECTION INTRAMUSCULAR; INTRAVENOUS EVERY 12 HOURS PRN
Status: DISCONTINUED | OUTPATIENT
Start: 2018-10-30 | End: 2018-10-31 | Stop reason: HOSPADM

## 2018-10-30 RX ORDER — SPIRONOLACTONE 25 MG/1
25 TABLET ORAL DAILY
Status: DISCONTINUED | OUTPATIENT
Start: 2018-10-30 | End: 2018-10-31 | Stop reason: HOSPADM

## 2018-10-30 RX ORDER — CLOPIDOGREL 300 MG/1
600 TABLET, FILM COATED ORAL ONCE
Status: COMPLETED | OUTPATIENT
Start: 2018-10-30 | End: 2018-10-30

## 2018-10-30 RX ADMIN — CARVEDILOL 3.12 MG: 3.12 TABLET, FILM COATED ORAL at 08:10

## 2018-10-30 RX ADMIN — SODIUM CHLORIDE 1.5 ML/KG/HR: 0.9 INJECTION, SOLUTION INTRAVENOUS at 03:10

## 2018-10-30 RX ADMIN — LOSARTAN POTASSIUM 25 MG: 25 TABLET, FILM COATED ORAL at 08:10

## 2018-10-30 RX ADMIN — FUROSEMIDE 20 MG: 10 INJECTION, SOLUTION INTRAMUSCULAR; INTRAVENOUS at 05:10

## 2018-10-30 RX ADMIN — LIOTHYRONINE SODIUM 10 MCG: 5 TABLET ORAL at 08:10

## 2018-10-30 RX ADMIN — FUROSEMIDE 20 MG: 10 INJECTION, SOLUTION INTRAMUSCULAR; INTRAVENOUS at 08:10

## 2018-10-30 RX ADMIN — SPIRONOLACTONE 25 MG: 25 TABLET ORAL at 08:10

## 2018-10-30 RX ADMIN — ASPIRIN 81 MG: 81 TABLET, COATED ORAL at 08:10

## 2018-10-30 RX ADMIN — SODIUM CHLORIDE: 0.9 INJECTION, SOLUTION INTRAVENOUS at 08:10

## 2018-10-30 RX ADMIN — LEVOTHYROXINE SODIUM 75 MCG: 75 TABLET ORAL at 06:10

## 2018-10-30 RX ADMIN — ENOXAPARIN SODIUM 40 MG: 100 INJECTION SUBCUTANEOUS at 05:10

## 2018-10-30 RX ADMIN — CARVEDILOL 3.12 MG: 3.12 TABLET, FILM COATED ORAL at 09:10

## 2018-10-30 RX ADMIN — CLOPIDOGREL BISULFATE 600 MG: 300 TABLET, FILM COATED ORAL at 08:10

## 2018-10-30 NOTE — CONSULTS
[facility]  Cardiology  Consult Note    Patient Name: Vipin Whitaker  MRN: [id number]  Admission Date: 10/27/2018  Hospital Length of Stay: 1 days  Code Status: Full Code   Attending Provider: Fabián Chisholm MD   Consulting Provider: Дмитрий Limon MD  Primary Care Physician: Rena Zafar MD  Principal Problem:<principal problem not specified>    Patient information was obtained from patient and ER records.     Inpatient consult to Cardiology  Consult performed by: Дмитрий Limon MD  Consult ordered by: Fabián Chisholm MD  Reason for consult: CHF        Subjective:     Chief Complaint:  CHF     HPI:   46 YO lady with most recent onset of Graves dis leading to hypothyroidism. Pt was started on Synthroid and added cytomel most recently. Has been having bilateral LE edema and elevated bp. Pt came to [facility] ED for further evaluation. Work up revealed slightly elevated bp and abnormal EKG. Pt dose significant chest pain or sob. Valentino LE edema has been improving. Family also reports changes in memory. Pt endorsed compliance with all medications.     Pt presents with sxs of biventricular CHF (SOB/orthop/LE edema) in setting of initial hyperthyroidism rx with methimazole and subsequent hypothyroidism.  She denies any anginal sxs, but does describe near syncope PTA.  No arrhythmias noted on tele since admit.  Echo with severe bivent dysfxn.    Past Medical History:   Diagnosis Date    Abnormal Pap smear     Depression     Hypertension 10/27/2018    Normocytic anemia 10/28/2018    Thyroid disease        Past Surgical History:   Procedure Laterality Date    COLONOSCOPY N/A 11/3/2017    Procedure: COLONOSCOPY;  Surgeon: Leighton Ross MD;  Location: [facility] ENDO;  Service: Endoscopy;  Laterality: N/A;    COLONOSCOPY N/A 11/3/2017    Performed by Leighton Ross MD at [facility] ENDO    SALPINGECTOMY         Review of patient's allergies indicates:  Not on File    No current facility-administered  medications on file prior to encounter.      Current Outpatient Medications on File Prior to Encounter   Medication Sig    levothyroxine (SYNTHROID) 75 MCG tablet Take 1 tablet (75 mcg total) by mouth once daily.    propranolol (INDERAL) 20 MG tablet Take 1 tablet (20 mg total) by mouth 2 (two) times daily.    liothyronine (CYTOMEL) 5 MCG Tab Take 2 tablets (10 mcg total) by mouth once daily.    propranolol (INDERAL) 20 MG tablet TAKE 1 TABLET (20 MG TOTAL) BY MOUTH 3 (THREE) TIMES DAILY.     Family History     Problem Relation (Age of Onset)    Cancer Sister, Maternal Aunt    Colon cancer Mother    Heart murmur Maternal Uncle    Hypertension Mother    No Known Problems Father, Brother        Tobacco Use    Smoking status: Never Smoker   Substance and Sexual Activity    Alcohol use: No    Drug use: No    Sexual activity: Yes     Partners: Male     Birth control/protection: None     Review of Systems   Constitution: Negative for chills, diaphoresis, fever, weakness and malaise/fatigue.   HENT: Negative for nosebleeds.    Eyes: Negative for blurred vision and double vision.   Cardiovascular: Positive for dyspnea on exertion, leg swelling and orthopnea. Negative for chest pain, claudication, cyanosis, palpitations, paroxysmal nocturnal dyspnea and syncope.   Respiratory: Positive for shortness of breath. Negative for cough and wheezing.    Skin: Negative for dry skin and poor wound healing.   Musculoskeletal: Negative for back pain, joint swelling and myalgias.   Gastrointestinal: Negative for abdominal pain, nausea and vomiting.   Genitourinary: Negative for hematuria.   Neurological: Negative for dizziness, headaches, numbness and seizures.   Psychiatric/Behavioral: Negative for altered mental status and depression.     Objective:     Vital Signs (Most Recent):  Temp: 98.6 °F (37 °C) (10/30/18 0423)  Pulse: 66 (10/30/18 0423)  Resp: 20 (10/30/18 0423)  BP: (!) 136/96 (10/30/18 0423)  SpO2: (!) 94 % (10/30/18  0423) Vital Signs (24h Range):  Temp:  [96.1 °F (35.6 °C)-98.6 °F (37 °C)] 98.6 °F (37 °C)  Pulse:  [62-69] 66  Resp:  [18-20] 20  SpO2:  [93 %-100 %] 94 %  BP: (135-152)/() 136/96     Weight: 66 kg (145 lb 8.1 oz)  Body mass index is 26.61 kg/m².    SpO2: (!) 94 %  O2 Device (Oxygen Therapy): room air      Intake/Output Summary (Last 24 hours) at 10/30/2018 0638  Last data filed at 10/29/2018 1721  Gross per 24 hour   Intake 720 ml   Output 200 ml   Net 520 ml       Lines/Drains/Airways     Peripheral Intravenous Line                 Peripheral IV - Single Lumen 10/27/18 1400 Antecubital 2 days                Physical Exam   Constitutional: She is oriented to person, place, and time. She appears well-developed and well-nourished. No distress.   HENT:   Head: Normocephalic and atraumatic.   Mouth/Throat: No oropharyngeal exudate.   Eyes: Conjunctivae and EOM are normal. Pupils are equal, round, and reactive to light. No scleral icterus.   Neck: Normal range of motion. Neck supple. No JVD present. No tracheal deviation present. No thyromegaly present.   Cardiovascular: Normal rate, regular rhythm, S1 normal and S2 normal. Exam reveals no gallop and no friction rub.   No murmur heard.  Pulmonary/Chest: Effort normal and breath sounds normal. No respiratory distress. She has no wheezes. She has no rales. She exhibits no tenderness.   Abdominal: Soft. She exhibits no distension.   Musculoskeletal: Normal range of motion. She exhibits edema.   Neurological: She is alert and oriented to person, place, and time. No cranial nerve deficit.   Skin: Skin is warm and dry. She is not diaphoretic.   Psychiatric: She has a normal mood and affect. Her behavior is normal. Judgment normal.       Current Medications:   aspirin  325 mg Oral Daily    enoxaparin  40 mg Subcutaneous Daily    furosemide  20 mg Intravenous BID    levothyroxine  75 mcg Oral Daily    liothyronine  10 mcg Oral Daily    losartan  25 mg Oral Daily     propranolol  20 mg Oral TID       cloNIDine, influenza, oxyCODONE-acetaminophen    Laboratory:  CBC:  Recent Labs   Lab 05/05/17  0900 05/07/18  0711 10/15/18  1240 10/27/18  1405 10/30/18  0507   WHITE BLOOD CELL COUNT 3.83 L 6.01 5.06 5.22 4.28   HEMOGLOBIN 11.0 L 13.1 13.1 11.5 L 11.6 L   HEMATOCRIT 35.3 L 42.5 39.4 35.0 L 34.8 L   PLATELETS 272 280 276 258 227       CHEMISTRIES:  Recent Labs   Lab 10/15/18  1240 10/27/18  1405 10/28/18  0631 10/29/18  0641 10/30/18  0507   GLUCOSE 98 108 90 89 109  107   SODIUM 142 145 142 139 140  140   POTASSIUM 4.9 3.2 L 4.2 3.3 L 3.7  3.7   BUN BLD 8 12 10 10 15  15   CREATININE 1.2 0.9 0.9 0.8 0.8  0.8   EGFR IF AFRICAN AMERICAN >60 >60 >60 >60 >60  >60   EGFR IF NON- 53 A >60 >60 >60 >60  >60   CALCIUM 9.9 9.2 8.5 L 8.7 9.0  8.9       CARDIAC BIOMARKERS:  Recent Labs   Lab 10/27/18  1405 10/27/18  2004 10/28/18  0130 10/28/18  1239   TROPONIN I 0.031 H 0.025 0.033 H 0.033 H       COAGS:  Recent Labs   Lab 10/27/18  1405   INR 1.5 H       LIPIDS/LFTS:  Recent Labs   Lab 05/05/17  0900 04/25/18  1041 05/07/18  0711 10/15/18  1240 10/30/18  0507   CHOLESTEROL 117 L  --  150  --   --    TRIGLYCERIDES 57  --  86  --   --    HDL 48  --  55  --   --    LDL CHOLESTEROL 57.6 L  --  77.8  --   --    NON-HDL CHOLESTEROL 69  --  95  --   --    AST 20 16 25 24 51 H   ALT 29 20 32 18 73 H       BNP:        TSH:  Recent Labs   Lab 08/10/17  1500 04/25/18  1041 05/07/18  0711 10/15/18  1240 10/28/18  1239   TSH <0.010 L 0.489 15.820 H 80.344 H 21.868 H       Free T4:  Recent Labs   Lab 05/05/17  0900 08/10/17  1500 05/07/18  0711 10/15/18  1240 10/28/18  1239   FREE T4 2.52 H 3.41 H 0.75 <0.40 L 0.72       Diagnostic Results:  ECG (personally reviewed tracings):  10/27/18 1400 SR 76, PRWP, PVC, inflat ST abnl ?isch    Chest X-Ray (personally reviewed image(s)): 10/27/18 CMeg without CHF    CTA Chest 10/27/18  -No pulmonary embolus is identified through the  segmental level.  -Imaging findings suggestive for elevated right heart pressure with reflux of contrast material into the IVC and hepatic veins.  -Trace left and small right-sided pleural effusion with mild interstitial pulmonary edema.  -Mild abdominal ascites.    LE venous US 10/27/18  No evidence of lower extremity deep venous thrombosis (bilat).    Echo: 10/29/18  · Left ventricle ejection fraction is severely decreased at 15%  · Severe global hypokinetic wall motion.  · The left ventricle cavity is severely dilated.  · Left ventricle shows eccentric hypertrophy.  · Grade III (severe) left ventricular diastolic dysfunction consistent with restrictive physiology.  · Right ventricular cavity size is mildly dilated.  · RV systolic function is moderately reduced.  · Moderate mitral regurgitation.  · Severe tricuspid regurgitation.  · The estimated PA systolic pressure is 39.36 mm Hg  · Trivial pericardial effusion without hemodynamic compromise        Assessment and Plan:     Acute combined systolic and diastolic congestive heart failure    Severe bivent dysfxn in setting of hyper->hypothyroidism  Currently on rx for hypothyroidism with normal T4  Followed by Dr. HAZEL Chisholm  No prior cardiac hx, minimal trop on admit  Cont losartan  Change propranolol to coreg 3.125mg bid  Add aldact   Lifevest ordered (pt described near syncope)    Plan R&LHC with sat run today    Risks, benefits and alternatives of the catheterization procedure were discussed with the patient and several family in attendance (incl ) which include but are not limited to: bleeding, infection, death, heart attack, arrhythmia, kidney failure, stroke, need for emergency surgery, etc.  Patient understands and and agrees to proceed.  Consent was placed on the chart.       Elevated troponin    As above     Hypertension    Cont med rx       Hypothyroidism    Per IM         VTE Risk Mitigation (From admission, onward)        Ordered     enoxaparin  injection 40 mg  Daily      10/29/18 0908     IP VTE LOW RISK PATIENT  Once      10/27/18 1913     Place sequential compression device  Until discontinued      10/27/18 1913     Place PEMA hose  Until discontinued      10/27/18 1913          Thank you for your consult. I will follow-up with patient. Please contact us if you have any additional questions.    Дмитрий Limon MD  Cardiology   [facility]

## 2018-10-30 NOTE — HPI
46 YO lady with most recent onset of Graves dis leading to hypothyroidism. Pt was started on Synthroid and added cytomel most recently. Has been having bilateral LE edema and elevated bp. Pt came to [facility] ED for further evaluation. Work up revealed slightly elevated bp and abnormal EKG. Pt dose significant chest pain or sob. Valentino LE edema has been improving. Family also reports changes in memory. Pt endorsed compliance with all medications.     Pt presents with sxs of biventricular CHF (SOB/orthop/LE edema) in setting of initial hyperthyroidism rx with methimazole and subsequent hypothyroidism.  She denies any anginal sxs, but does describe near syncope PTA.  No arrhythmias noted on tele since admit.  Echo with severe bivent dysfxn.

## 2018-10-30 NOTE — NURSING
Note, patient brought back to room by RN's Bossman Peters of the Cath Lab;  Patient asking questions,lying flat;   BP elevated (165/101) breathing regular, unlalbored;  No bleeding to right groin area; Patient denies pain at this time;     Will continue to f/u;

## 2018-10-30 NOTE — SUBJECTIVE & OBJECTIVE
Past Medical History:   Diagnosis Date    Abnormal Pap smear     Depression     Hypertension 10/27/2018    Normocytic anemia 10/28/2018    Thyroid disease        Past Surgical History:   Procedure Laterality Date    COLONOSCOPY N/A 11/3/2017    Procedure: COLONOSCOPY;  Surgeon: Leighton Ross MD;  Location: [facility];  Service: Endoscopy;  Laterality: N/A;    COLONOSCOPY N/A 11/3/2017    Performed by Leighton Ross MD at [facility] ENDO    SALPINGECTOMY         Review of patient's allergies indicates:  Not on File    No current facility-administered medications on file prior to encounter.      Current Outpatient Medications on File Prior to Encounter   Medication Sig    levothyroxine (SYNTHROID) 75 MCG tablet Take 1 tablet (75 mcg total) by mouth once daily.    propranolol (INDERAL) 20 MG tablet Take 1 tablet (20 mg total) by mouth 2 (two) times daily.    liothyronine (CYTOMEL) 5 MCG Tab Take 2 tablets (10 mcg total) by mouth once daily.    propranolol (INDERAL) 20 MG tablet TAKE 1 TABLET (20 MG TOTAL) BY MOUTH 3 (THREE) TIMES DAILY.     Family History     Problem Relation (Age of Onset)    Cancer Sister, Maternal Aunt    Colon cancer Mother    Heart murmur Maternal Uncle    Hypertension Mother    No Known Problems Father, Brother        Tobacco Use    Smoking status: Never Smoker   Substance and Sexual Activity    Alcohol use: No    Drug use: No    Sexual activity: Yes     Partners: Male     Birth control/protection: None     Review of Systems   Constitution: Negative for chills, diaphoresis, fever, weakness and malaise/fatigue.   HENT: Negative for nosebleeds.    Eyes: Negative for blurred vision and double vision.   Cardiovascular: Positive for dyspnea on exertion, leg swelling and orthopnea. Negative for chest pain, claudication, cyanosis, palpitations, paroxysmal nocturnal dyspnea and syncope.   Respiratory: Positive for shortness of breath. Negative for cough and wheezing.    Skin: Negative for  dry skin and poor wound healing.   Musculoskeletal: Negative for back pain, joint swelling and myalgias.   Gastrointestinal: Negative for abdominal pain, nausea and vomiting.   Genitourinary: Negative for hematuria.   Neurological: Negative for dizziness, headaches, numbness and seizures.   Psychiatric/Behavioral: Negative for altered mental status and depression.     Objective:     Vital Signs (Most Recent):  Temp: 98.6 °F (37 °C) (10/30/18 0423)  Pulse: 66 (10/30/18 0423)  Resp: 20 (10/30/18 0423)  BP: (!) 136/96 (10/30/18 0423)  SpO2: (!) 94 % (10/30/18 0423) Vital Signs (24h Range):  Temp:  [96.1 °F (35.6 °C)-98.6 °F (37 °C)] 98.6 °F (37 °C)  Pulse:  [62-69] 66  Resp:  [18-20] 20  SpO2:  [93 %-100 %] 94 %  BP: (135-152)/() 136/96     Weight: 66 kg (145 lb 8.1 oz)  Body mass index is 26.61 kg/m².    SpO2: (!) 94 %  O2 Device (Oxygen Therapy): room air      Intake/Output Summary (Last 24 hours) at 10/30/2018 0638  Last data filed at 10/29/2018 1721  Gross per 24 hour   Intake 720 ml   Output 200 ml   Net 520 ml       Lines/Drains/Airways     Peripheral Intravenous Line                 Peripheral IV - Single Lumen 10/27/18 1400 Antecubital 2 days                Physical Exam   Constitutional: She is oriented to person, place, and time. She appears well-developed and well-nourished. No distress.   HENT:   Head: Normocephalic and atraumatic.   Mouth/Throat: No oropharyngeal exudate.   Eyes: Conjunctivae and EOM are normal. Pupils are equal, round, and reactive to light. No scleral icterus.   Neck: Normal range of motion. Neck supple. No JVD present. No tracheal deviation present. No thyromegaly present.   Cardiovascular: Normal rate, regular rhythm, S1 normal and S2 normal. Exam reveals no gallop and no friction rub.   No murmur heard.  Pulmonary/Chest: Effort normal and breath sounds normal. No respiratory distress. She has no wheezes. She has no rales. She exhibits no tenderness.   Abdominal: Soft. She  exhibits no distension.   Musculoskeletal: Normal range of motion. She exhibits edema.   Neurological: She is alert and oriented to person, place, and time. No cranial nerve deficit.   Skin: Skin is warm and dry. She is not diaphoretic.   Psychiatric: She has a normal mood and affect. Her behavior is normal. Judgment normal.       Current Medications:   aspirin  325 mg Oral Daily    enoxaparin  40 mg Subcutaneous Daily    furosemide  20 mg Intravenous BID    levothyroxine  75 mcg Oral Daily    liothyronine  10 mcg Oral Daily    losartan  25 mg Oral Daily    propranolol  20 mg Oral TID       cloNIDine, influenza, oxyCODONE-acetaminophen    Laboratory:  CBC:  Recent Labs   Lab 05/05/17  0900 05/07/18  0711 10/15/18  1240 10/27/18  1405 10/30/18  0507   WHITE BLOOD CELL COUNT 3.83 L 6.01 5.06 5.22 4.28   HEMOGLOBIN 11.0 L 13.1 13.1 11.5 L 11.6 L   HEMATOCRIT 35.3 L 42.5 39.4 35.0 L 34.8 L   PLATELETS 272 280 276 258 227       CHEMISTRIES:  Recent Labs   Lab 10/15/18  1240 10/27/18  1405 10/28/18  0631 10/29/18  0641 10/30/18  0507   GLUCOSE 98 108 90 89 109  107   SODIUM 142 145 142 139 140  140   POTASSIUM 4.9 3.2 L 4.2 3.3 L 3.7  3.7   BUN BLD 8 12 10 10 15  15   CREATININE 1.2 0.9 0.9 0.8 0.8  0.8   EGFR IF AFRICAN AMERICAN >60 >60 >60 >60 >60  >60   EGFR IF NON- 53 A >60 >60 >60 >60  >60   CALCIUM 9.9 9.2 8.5 L 8.7 9.0  8.9       CARDIAC BIOMARKERS:  Recent Labs   Lab 10/27/18  1405 10/27/18  2004 10/28/18  0130 10/28/18  1239   TROPONIN I 0.031 H 0.025 0.033 H 0.033 H       COAGS:  Recent Labs   Lab 10/27/18  1405   INR 1.5 H       LIPIDS/LFTS:  Recent Labs   Lab 05/05/17  0900 04/25/18  1041 05/07/18  0711 10/15/18  1240 10/30/18  0507   CHOLESTEROL 117 L  --  150  --   --    TRIGLYCERIDES 57  --  86  --   --    HDL 48  --  55  --   --    LDL CHOLESTEROL 57.6 L  --  77.8  --   --    NON-HDL CHOLESTEROL 69  --  95  --   --    AST 20 16 25 24 51 H   ALT 29 20 32 18 73 H       BNP:         TSH:  Recent Labs   Lab 08/10/17  1500 04/25/18  1041 05/07/18  0711 10/15/18  1240 10/28/18  1239   TSH <0.010 L 0.489 15.820 H 80.344 H 21.868 H       Free T4:  Recent Labs   Lab 05/05/17  0900 08/10/17  1500 05/07/18  0711 10/15/18  1240 10/28/18  1239   FREE T4 2.52 H 3.41 H 0.75 <0.40 L 0.72       Diagnostic Results:  ECG (personally reviewed tracings):  10/27/18 1400 SR 76, PRWP, PVC, inflat ST abnl ?isch    Chest X-Ray (personally reviewed image(s)): 10/27/18 CMeg without CHF    CTA Chest 10/27/18  -No pulmonary embolus is identified through the segmental level.  -Imaging findings suggestive for elevated right heart pressure with reflux of contrast material into the IVC and hepatic veins.  -Trace left and small right-sided pleural effusion with mild interstitial pulmonary edema.  -Mild abdominal ascites.    LE venous US 10/27/18  No evidence of lower extremity deep venous thrombosis (bilat).    Echo: 10/29/18  · Left ventricle ejection fraction is severely decreased at 15%  · Severe global hypokinetic wall motion.  · The left ventricle cavity is severely dilated.  · Left ventricle shows eccentric hypertrophy.  · Grade III (severe) left ventricular diastolic dysfunction consistent with restrictive physiology.  · Right ventricular cavity size is mildly dilated.  · RV systolic function is moderately reduced.  · Moderate mitral regurgitation.  · Severe tricuspid regurgitation.  · The estimated PA systolic pressure is 39.36 mm Hg  · Trivial pericardial effusion without hemodynamic compromise

## 2018-10-30 NOTE — ASSESSMENT & PLAN NOTE
Severe bivent dysfxn in setting of hyper->hypothyroidism  Currently on rx for hypothyroidism with normal T4  Followed by Dr. HAZEL Chisholm  Cath 10/30/18 c/w NICM and elev LVEDP with secondary PHTN  Cont Med rx:  ASA 81mg qd  IV lasix for goal net neg 1-2L/day, inc to 40mg IV BID  Titrate losartan to 50mg qd  Cont Coreg/Aldact  Lifevest ordered  Cont rx of hypothyroidism  Repeat echo 3 months (late Jan 2019) for reassessment of LVEF and need for ICD

## 2018-10-30 NOTE — PROGRESS NOTES
Progress Note    Admit Date: 10/27/2018   LOS: 1 day     SUBJECTIVE:     Mrs. Whitaker 48 YO lady with most recent onset of Graves dis leading to hypothyroidism. Pt was started on Synthroid and added cytomel most recently. Has been having bilateral LE edema and elevated bp. Pt came to [facility] ED for further evaluation. Work up revealed slightly elevated bp and abnormal EKG. Pt dose significant chest pain or sob. Valentino LE edema has been improving. Family also reports changes in memory.   Pt endorsed compliance with all medications.     Follow-up For:      10/30/18: Denies fever or chills. No cp or excess sob.   10/29/18: Denies any chest pain, sob. Valentino LE edema improving. BP fluctuates         Scheduled Meds:   aspirin  81 mg Oral Daily    carvedilol  3.125 mg Oral BID    enoxaparin  40 mg Subcutaneous Daily    furosemide  20 mg Intravenous BID    levothyroxine  75 mcg Oral Daily    liothyronine  10 mcg Oral Daily    losartan  25 mg Oral Daily    sodium chloride 0.9%  1.5 mL/kg/hr Intravenous Once    spironolactone  25 mg Oral Daily     Continuous Infusions:  PRN Meds:acetaminophen, atropine, cloNIDine, HYDROcodone-acetaminophen, influenza, morphine, ondansetron, oxyCODONE-acetaminophen, sodium chloride 0.9%    Review of patient's allergies indicates:  Not on File    Review of Systems    Constitutional: Denies fever or chills  Eyes: no visual changes   ENT: no nasal congestion. Denies sore throat with odynophagia   Respiratory: No cough, SOB, exertional dyspnea or  hemoptysis   Cardiovascular: see HPI  Gastrointestinal: no nausea, vomiting or abdominal pain.   Hematologic/Lymphatic: No lymphadenopathy, no significant fatigue, fever or night sweat. No mucocutaneous bleeding   Musculoskeletal: No ROM abnormalities or muscle weakness   Neurological: no seizures or tremors, gait or balance prblems  Skin: no skin lesions  Psych: no mood issues.       OBJECTIVE:     Vital Signs (Most Recent)    Temp: 98.1 °F (36.7 °C)  (10/30/18 1531)  Pulse: 71 (10/30/18 1531)  Resp: 18 (10/30/18 1531)  BP: (!) 147/82 (10/30/18 1531)  SpO2: 98 % (10/30/18 1531)    Vital Signs Range (Last 24H):  Temp:  [97.4 °F (36.3 °C)-98.6 °F (37 °C)]   Pulse:  [64-72]   Resp:  [16-20]   BP: (136-169)/()   SpO2:  [93 %-100 %]     I & O (Last 24H):    Intake/Output Summary (Last 24 hours) at 10/30/2018 1654  Last data filed at 10/30/2018 0600  Gross per 24 hour   Intake 600 ml   Output --   Net 600 ml     Physical Exam:    General: NAD. Walking in the room. Family at the bedside    Head: normocephalic, atraumatic.    Eyes: conjunctivae pink, anicteric sclera.   Throat: No erythema or post nasal discharge   Neck: supple, no LAD   Lungs: scattered rhonchi and crackles at the bases   Heart: S1, S2, RRR   Abdomen: + BS x 4 quadrants, soft, non tender..   Extremities: pitting edema of LE bilaterally. Pressure stocking in placem   Skin: turgor normal  MS: move all extremities    Psy: pleasant, affect is congruent to mood       Laboratory:  CBC:   Recent Labs   Lab 10/30/18  0507   WBC 4.28   RBC 3.73*   HGB 11.6*   HCT 34.8*      MCV 93   MCH 31.1*   MCHC 33.3     CMP:   Recent Labs   Lab 10/30/18  0507     107   CALCIUM 9.0  8.9   ALBUMIN 3.8   PROT 6.6     140   K 3.7  3.7   CO2 21*  21*     107   BUN 15  15   CREATININE 0.8  0.8   ALKPHOS 110   ALT 73*   AST 51*   BILITOT 1.7*     Coagulation:   Recent Labs   Lab 10/27/18  1405   LABPROT 15.2*   INR 1.5*     Cardiac markers:   Recent Labs   Lab 10/28/18  1239   TROPONINI 0.033*     ABGs:   Recent Labs   Lab 10/29/18  1744   PH 7.435   PCO2 34.9*   PO2 74*   HCO3 23.4*   POCSATURATED 95   BE 0     Microbiology Results (last 7 days)     ** No results found for the last 168 hours. **        Specimen (12h ago, onward)    None        Recent Labs   Lab 10/29/18  1439   COLORU Jeni   SPECGRAV >1.030*   PHUR 5.0   PROTEINUA 2+*   BACTERIA Rare   NITRITE Negative   LEUKOCYTESUR  Negative   UROBILINOGEN 4.0-6.0*   HYALINECASTS 2*       Diagnostic Results:    Current Facility-Administered Medications   Medication Dose Route Frequency Provider Last Rate Last Dose    acetaminophen tablet 650 mg  650 mg Oral Q4H PRN Дмитрий Limon MD        aspirin EC tablet 81 mg  81 mg Oral Daily Дмитрий Limon MD   81 mg at 10/30/18 0816    atropine injection 0.5 mg  0.5 mg Intravenous PRN Дмитрий Limon MD        carvedilol tablet 3.125 mg  3.125 mg Oral BID Дмитрий Limon MD   3.125 mg at 10/30/18 0816    cloNIDine tablet 0.2 mg  0.2 mg Oral Q4H PRN Jean Mireles MD        enoxaparin injection 40 mg  40 mg Subcutaneous Daily Fabián Chisholm MD   40 mg at 10/29/18 1427    furosemide injection 20 mg  20 mg Intravenous BID Jean Mireles MD   20 mg at 10/30/18 0816    HYDROcodone-acetaminophen 5-325 mg per tablet 1 tablet  1 tablet Oral Q4H PRN Дмитрий Limon MD        influenza (FLUZONE QUADRIVALENT) vaccine 0.5 mL  0.5 mL Intramuscular vaccine x 1 dose Jean Mireles MD        levothyroxine tablet 75 mcg  75 mcg Oral Daily Jean Mireles MD   75 mcg at 10/30/18 0632    liothyronine tablet 10 mcg  10 mcg Oral Daily Jean Mireles MD   10 mcg at 10/30/18 0815    losartan tablet 25 mg  25 mg Oral Daily Fabián Chisholm MD   25 mg at 10/30/18 0816    morphine injection 2 mg  2 mg Intravenous Q10 Min PRN Дмитрий Limon MD        ondansetron injection 4 mg  4 mg Intravenous Q12H PRN Дмитрий Limon MD        oxyCODONE-acetaminophen 5-325 mg per tablet 1 tablet  1 tablet Oral Q6H PRN Fabián Chisholm MD   1 tablet at 10/29/18 2123    sodium chloride 0.9% bolus 250 mL  250 mL Intravenous PRN Дмитрий Limon MD        sodium chloride 0.9% bolus  1.5 mL/kg/hr Intravenous Once Дмитрий Limon MD 99 mL/hr at 10/30/18 1534 1.5 mL/kg/hr at 10/30/18 1534    spironolactone tablet 25 mg  25 mg Oral Daily Дмитрий Limon MD   25 mg  at 10/30/18 0816         ASSESSMENT/PLAN:       Active Hospital Problems     Diagnosis   POA    Hypertensive emergency [I16.1]  - with EKG changes and + trop  - pt chest pain free now   - bp waxing and waning   - CTA without PE  - adjust meds   Yes    Elevated troponin [R74.8]  - mild   - free of chest pain and sob      Yes    ST segment depression [R94.31]  - ? Due to bp  - EKG in the am and compare  - 2-D Echo done and reviewed  - cardiology consulted      Yes    Other specified hypothyroidism [E03.8]  - thyroid panel: TSH continue to be high but better  - pt to continue syn and cytomel as ordered           Yes    Elevated INR [R79.1]  - no bleeding  ? Hypoproteinemia  -UA + protein leak         Yes    Normocytic anemia [D64.9]   Yes    Bilateral leg edema [R60.0]  -   Yes    Hypertension [I10]   Yes       Resolved Hospital Problems   No resolved problems to display.      DVT pro: Lovenox      Reviewed 2-D Echo    · Left ventricle ejection fraction is severely decreased at 15%  · Severe global hypokinetic wall motion.  · The left ventricle cavity is severely dilated.  · Left ventricle shows eccentric hypertrophy.  · Grade III (severe) left ventricular diastolic dysfunction consistent with restrictive physiology.  · Right ventricular cavity size is mildly dilated.  · RV systolic function is moderately reduced.  · Moderate mitral regurgitation.  · Severe tricuspid regurgitation.  · The estimated PA systolic pressure is 39.36 mm Hg  · Trivial pericardial effusion without hemodynamic compromise    Cardiomyopathy: bi ventricular: ischemic vs. Non ischemia    Due to pt's severity of symptoms, presentation and physical exam finding, pt need further work up. Pt has both LV and RV dysfxn- with new onset symptoms. Her mental status- orientation x 1 most of the time- pt may have hypoxia/hypoxemia.   Plan:   - Cardiology consulted for cardiomyopathy evaluation   - ABG- reviewed: pt has hypoxemia   - Waking  desaturation   - pt's thyroid fxn- improving- TSH lagging behind    - check electrolyte status    - repeat EKG if cp/sob  - discussed case with Dr. Limon today- may undergo heart cath.           Fabián Chisholm M.D  Internal Medicine & Geriatric Medicine  Hematology & Oncology  Palliative Medicine    [address] [facility], [address]  [place] [address] [phone] (Office)  [phone] (Fax)

## 2018-10-30 NOTE — CONSULTS
Orders received for lifevest.  Facesheet, orders, cards note, ANGELINE results and H&P sent to [facility] via fax at:[phone].  Awaiting response.    1415:  Follow up on lifevest.  Patient denied per insurance co due to lack of previous records of EF and NYHA class.  TN spoke with Dr Limon who stated that pt is Class III EF 15% which is documented in the progress note sent to [facility].  No previous records are available as this is a new finding for this patient.  TN called JT at [facility] who will speak with insurance rep and call back with further direction.

## 2018-10-30 NOTE — NURSING
12 hour chart check complete;    Patient aware that she has a cardiology consult in the am;    Will continue to f/u;

## 2018-10-30 NOTE — ASSESSMENT & PLAN NOTE
Severe bivent dysfxn in setting of hyper->hypothyroidism  Currently on rx for hypothyroidism with normal T4  Followed by Dr. HAZEL Chisholm  No prior cardiac hx, minimal trop on admit  Cont losartan  Change propranolol to coreg 3.125mg bid  Add aldact     Plan R&LHC with sat run today    Risks, benefits and alternatives of the catheterization procedure were discussed with the patient and several family in attendance (incl ) which include but are not limited to: bleeding, infection, death, heart attack, arrhythmia, kidney failure, stroke, need for emergency surgery, etc.  Patient understands and and agrees to proceed.  Consent was placed on the chart.

## 2018-10-30 NOTE — BRIEF OP NOTE
[facility] [place]     SUMMARY     Surgery Date: 10/30/2018     Surgeon(s) and Role:     * Дмитрий Limon MD - Primary    Assisting Surgeon: None    Pre-op Diagnosis:  Acute combined systolic and diastolic congestive heart failure [I50.41]  Elevated troponin [R74.8]    Post-op Diagnosis:  Post-Op Diagnosis Codes:     * Acute combined systolic and diastolic congestive heart failure [I50.41]     * Elevated troponin [R74.8]    Procedure(s) (LRB):  CATHETERIZATION, HEART, BOTH LEFT AND RIGHT with sat run. RFA access (Bilateral)    Anesthesia: RN IV Sedation    Description of the findings of the procedure: see below, findings c/w NICM.    Findings/Key Components:  Pressures:  RA 16  RV 68/19  PA 67/26/39  PAWP 23  /20  Ao 138/76/98  Thermal CO 4.91 L/min    Sats:  RA 52.5%  RV 55.8%  PA 55.3%  FA 94%  Naye CO 2.7L/min    LVEF: 15% by echo  Wall Motion: severe global HK    Dominance: Right  LM: normal  LAD: normal  LCx: normal  RCA: normal    Hemostasis:  RFA/V man compression    Impression:  Normal cors with severe LV dysfxn  Elevated LVEDP with mod-sev PHTN  Findings c/w NICM    Plan:  Cont Med rx:  ASA 81mg qd  IV lasix for goal net neg 1-2L/day  Cont coreg/ARB/aldact and titrate as HR/BP will allow  Lifevest ordered  Cont rx of hypothyroidism  Repeat echo 3 months (late Jan 2019) for reassessment of LVEF and need for ICD    Estimated Blood Loss: <50cc         Specimens: None

## 2018-10-31 VITALS
TEMPERATURE: 99 F | SYSTOLIC BLOOD PRESSURE: 133 MMHG | HEIGHT: 62 IN | WEIGHT: 145.5 LBS | DIASTOLIC BLOOD PRESSURE: 92 MMHG | HEART RATE: 64 BPM | OXYGEN SATURATION: 100 % | RESPIRATION RATE: 18 BRPM | BODY MASS INDEX: 26.78 KG/M2

## 2018-10-31 LAB
ANION GAP SERPL CALC-SCNC: 12 MMOL/L
BASOPHILS # BLD AUTO: 0.02 K/UL
BASOPHILS NFR BLD: 0.4 %
BUN SERPL-MCNC: 13 MG/DL
CALCIUM SERPL-MCNC: 8.5 MG/DL
CHLORIDE SERPL-SCNC: 106 MMOL/L
CO2 SERPL-SCNC: 22 MMOL/L
CREAT SERPL-MCNC: 0.8 MG/DL
DIFFERENTIAL METHOD: ABNORMAL
EOSINOPHIL # BLD AUTO: 0 K/UL
EOSINOPHIL NFR BLD: 0.8 %
ERYTHROCYTE [DISTWIDTH] IN BLOOD BY AUTOMATED COUNT: 15 %
EST. GFR  (AFRICAN AMERICAN): >60 ML/MIN/1.73 M^2
EST. GFR  (NON AFRICAN AMERICAN): >60 ML/MIN/1.73 M^2
GLUCOSE SERPL-MCNC: 86 MG/DL
HCT VFR BLD AUTO: 39.2 %
HGB BLD-MCNC: 12.6 G/DL
LYMPHOCYTES # BLD AUTO: 1.1 K/UL
LYMPHOCYTES NFR BLD: 21.6 %
MCH RBC QN AUTO: 29.9 PG
MCHC RBC AUTO-ENTMCNC: 32.1 G/DL
MCV RBC AUTO: 93 FL
MONOCYTES # BLD AUTO: 0.8 K/UL
MONOCYTES NFR BLD: 15.9 %
NEUTROPHILS # BLD AUTO: 3 K/UL
NEUTROPHILS NFR BLD: 61.1 %
PLATELET # BLD AUTO: 230 K/UL
PMV BLD AUTO: 12.9 FL
POTASSIUM SERPL-SCNC: 3.2 MMOL/L
RBC # BLD AUTO: 4.22 M/UL
SODIUM SERPL-SCNC: 140 MMOL/L
WBC # BLD AUTO: 4.9 K/UL

## 2018-10-31 PROCEDURE — 80048 BASIC METABOLIC PNL TOTAL CA: CPT

## 2018-10-31 PROCEDURE — 25000003 PHARM REV CODE 250: Performed by: INTERNAL MEDICINE

## 2018-10-31 PROCEDURE — 86235 NUCLEAR ANTIGEN ANTIBODY: CPT

## 2018-10-31 PROCEDURE — 63600175 PHARM REV CODE 636 W HCPCS: Performed by: EMERGENCY MEDICINE

## 2018-10-31 PROCEDURE — 25000003 PHARM REV CODE 250: Performed by: EMERGENCY MEDICINE

## 2018-10-31 PROCEDURE — 85025 COMPLETE CBC W/AUTO DIFF WBC: CPT

## 2018-10-31 PROCEDURE — 86038 ANTINUCLEAR ANTIBODIES: CPT

## 2018-10-31 PROCEDURE — 99233 SBSQ HOSP IP/OBS HIGH 50: CPT | Mod: ,,, | Performed by: INTERNAL MEDICINE

## 2018-10-31 RX ORDER — FUROSEMIDE 40 MG/1
40 TABLET ORAL 2 TIMES DAILY
Status: DISCONTINUED | OUTPATIENT
Start: 2018-10-31 | End: 2018-10-31 | Stop reason: HOSPADM

## 2018-10-31 RX ORDER — SPIRONOLACTONE 25 MG/1
25 TABLET ORAL DAILY
Qty: 30 TABLET | Refills: 11 | Status: SHIPPED | OUTPATIENT
Start: 2018-11-01 | End: 2019-11-01

## 2018-10-31 RX ORDER — LIOTHYRONINE SODIUM 5 UG/1
10 TABLET ORAL DAILY
Qty: 60 TABLET | Refills: 0 | Status: SHIPPED | OUTPATIENT
Start: 2018-10-31 | End: 2018-11-12

## 2018-10-31 RX ORDER — CLONIDINE HYDROCHLORIDE 0.1 MG/1
0.2 TABLET ORAL 2 TIMES DAILY
Qty: 60 TABLET | Refills: 3 | Status: SHIPPED | OUTPATIENT
Start: 2018-10-31 | End: 2018-11-16

## 2018-10-31 RX ORDER — POTASSIUM CHLORIDE 20 MEQ/1
40 TABLET, EXTENDED RELEASE ORAL ONCE
Status: COMPLETED | OUTPATIENT
Start: 2018-10-31 | End: 2018-10-31

## 2018-10-31 RX ORDER — LOSARTAN POTASSIUM 50 MG/1
50 TABLET ORAL DAILY
Qty: 90 TABLET | Refills: 3 | Status: SHIPPED | OUTPATIENT
Start: 2018-11-01 | End: 2019-11-01

## 2018-10-31 RX ORDER — FUROSEMIDE 40 MG/1
40 TABLET ORAL 2 TIMES DAILY
Qty: 60 TABLET | Refills: 3 | Status: SHIPPED | OUTPATIENT
Start: 2018-10-31 | End: 2019-03-10 | Stop reason: SDUPTHER

## 2018-10-31 RX ORDER — LOSARTAN POTASSIUM 25 MG/1
25 TABLET ORAL ONCE
Status: DISCONTINUED | OUTPATIENT
Start: 2018-10-31 | End: 2018-10-31 | Stop reason: HOSPADM

## 2018-10-31 RX ORDER — OXYCODONE AND ACETAMINOPHEN 5; 325 MG/1; MG/1
1 TABLET ORAL EVERY 6 HOURS PRN
Qty: 30 TABLET | Refills: 0 | Status: SHIPPED | OUTPATIENT
Start: 2018-10-31

## 2018-10-31 RX ORDER — LOSARTAN POTASSIUM 25 MG/1
50 TABLET ORAL DAILY
Status: DISCONTINUED | OUTPATIENT
Start: 2018-11-01 | End: 2018-10-31 | Stop reason: HOSPADM

## 2018-10-31 RX ORDER — CARVEDILOL 3.12 MG/1
3.12 TABLET ORAL 2 TIMES DAILY
Qty: 60 TABLET | Refills: 11 | Status: SHIPPED | OUTPATIENT
Start: 2018-10-31 | End: 2018-11-16 | Stop reason: SDUPTHER

## 2018-10-31 RX ADMIN — LIOTHYRONINE SODIUM 10 MCG: 5 TABLET ORAL at 10:10

## 2018-10-31 RX ADMIN — ASPIRIN 81 MG: 81 TABLET, COATED ORAL at 08:10

## 2018-10-31 RX ADMIN — CARVEDILOL 3.12 MG: 3.12 TABLET, FILM COATED ORAL at 08:10

## 2018-10-31 RX ADMIN — FUROSEMIDE 20 MG: 10 INJECTION, SOLUTION INTRAMUSCULAR; INTRAVENOUS at 08:10

## 2018-10-31 RX ADMIN — LEVOTHYROXINE SODIUM 75 MCG: 75 TABLET ORAL at 05:10

## 2018-10-31 RX ADMIN — LOSARTAN POTASSIUM 25 MG: 25 TABLET, FILM COATED ORAL at 08:10

## 2018-10-31 RX ADMIN — SPIRONOLACTONE 25 MG: 25 TABLET ORAL at 08:10

## 2018-10-31 RX ADMIN — POTASSIUM CHLORIDE 40 MEQ: 1500 TABLET, EXTENDED RELEASE ORAL at 11:10

## 2018-10-31 NOTE — PLAN OF CARE
10/31/18 1818   Final Note   Assessment Type Final Discharge Note   Anticipated Discharge Disposition Home-Health   What phone number can be called within the next 1-3 days to see how you are doing after discharge? ([phone])   Hospital Follow Up  Appt(s) scheduled? Yes   Discharge plans and expectations educations in teach back method with documentation complete? Yes   Right Care Referral Info   Referral Type Home [facility]   Facility Name [facility]

## 2018-10-31 NOTE — PLAN OF CARE
Problem: Hypertensive Disease/Crisis (Arterial) (Adult)  Intervention: Cluster Activity/Decrease Environmental Stimulation   10/30/18 1944   Cognitive Interventions   Sensory Stimulation Regulation auditory stimulation minimized;visual stimulation minimized;tactile stimulation minimized     Intervention: Promote Rest/Minimize Oxygen Consumption   10/31/18 0702   Coping/Psychosocial Interventions   Environmental Support calm environment promoted;rest periods encouraged;environmental consistency promoted   Activity   Activity Management ambulated to bathroom* - L4;activity clustered for rest period*;activity adjusted per tolerance*   Restraint Interventions   Bilateral Upper and Lower Extremities Range Of Motion AROM (active range of motion) performed     Intervention: Support/Optimize Psychosocial Response to Illness   10/31/18 0702   Coping/Psychosocial Interventions   Supportive Measures active listening utilized;self-care encouraged     Intervention: Gradually Decrease Blood Pressure   10/31/18 0702   Safety Interventions   Medication Review/Management medications reviewed

## 2018-10-31 NOTE — PROGRESS NOTES
Progress Note    Admit Date: 10/27/2018   LOS: 2 days     SUBJECTIVE:     Mrs. Whitaker 46 YO lady with most recent onset of Graves dis leading to hypothyroidism. Pt was started on Synthroid and added cytomel most recently. Has been having bilateral LE edema and elevated bp. Pt came to [facility] ED for further evaluation. Work up revealed slightly elevated bp and abnormal EKG. Pt dose significant chest pain or sob. Valentino LE edema has been improving. Family also reports changes in memory.   Pt endorsed compliance with all medications.     Follow-up For:      10/31/18: underwent heart cath- tolerated procedure. Feeling better.   10/30/18: Denies fever or chills. No cp or excess sob.   10/29/18: Denies any chest pain, sob. Valentino LE edema improving. BP fluctuates         Scheduled Meds:   aspirin  81 mg Oral Daily    carvedilol  3.125 mg Oral BID    enoxaparin  40 mg Subcutaneous Daily    furosemide  40 mg Oral BID    levothyroxine  75 mcg Oral Daily    liothyronine  10 mcg Oral Daily    losartan  25 mg Oral Once    [START ON 11/1/2018] losartan  50 mg Oral Daily    spironolactone  25 mg Oral Daily     Continuous Infusions:  PRN Meds:acetaminophen, atropine, cloNIDine, HYDROcodone-acetaminophen, influenza, morphine, ondansetron, oxyCODONE-acetaminophen, sodium chloride 0.9%    Review of patient's allergies indicates:  Not on File    Review of Systems    Constitutional: Denies fever or chills  Eyes: no visual changes   ENT: no nasal congestion. Denies sore throat with odynophagia   Respiratory: No cough, SOB, exertional dyspnea or  hemoptysis   Cardiovascular: see HPI  Gastrointestinal: no nausea, vomiting or abdominal pain.   Hematologic/Lymphatic: No lymphadenopathy, no significant fatigue, fever or night sweat. No mucocutaneous bleeding   Musculoskeletal: No ROM abnormalities or muscle weakness   Neurological: no seizures or tremors, gait or balance prblems  Skin: no skin lesions  Psych: no mood issues.        OBJECTIVE:     Vital Signs (Most Recent)    Temp: 98.5 °F (36.9 °C) (10/31/18 1653)  Pulse: 64 (10/31/18 1653)  Resp: 18 (10/31/18 1653)  BP: (!) 133/92 (10/31/18 1653)  SpO2: 100 % (10/31/18 1653)    Vital Signs Range (Last 24H):  Temp:  [97.4 °F (36.3 °C)-98.6 °F (37 °C)]   Pulse:  [60-82]   Resp:  [17-18]   BP: (133-161)/()   SpO2:  [93 %-100 %]     I & O (Last 24H):  No intake or output data in the 24 hours ending 10/31/18 1743  Physical Exam:    General: NAD. Resting in bed. Family in the rom     Head: normocephalic, atraumatic.    Eyes: conjunctivae pink, anicteric sclera.   Throat: No erythema or post nasal discharge   Neck: supple, no LAD   Lungs: scattered rhonchi and crackles at the bases   Heart: S1, S2, RRR   Abdomen: + BS x 4 quadrants, soft, non tender..   Extremities: pitting edema of LE bilaterally. Pressure stocking in place   Skin: turgor normal  MS: move all extremities    Psy: pleasant, affect is congruent to mood       Laboratory:  CBC:   Recent Labs   Lab 10/31/18  0729   WBC 4.90   RBC 4.22   HGB 12.6   HCT 39.2      MCV 93   MCH 29.9   MCHC 32.1     CMP:   Recent Labs   Lab 10/30/18  0507 10/31/18  0729     107 86   CALCIUM 9.0  8.9 8.5*   ALBUMIN 3.8  --    PROT 6.6  --      140 140   K 3.7  3.7 3.2*   CO2 21*  21* 22*     107 106   BUN 15  15 13   CREATININE 0.8  0.8 0.8   ALKPHOS 110  --    ALT 73*  --    AST 51*  --    BILITOT 1.7*  --      Coagulation:   Recent Labs   Lab 10/27/18  1405   LABPROT 15.2*   INR 1.5*     Cardiac markers:   Recent Labs   Lab 10/28/18  1239   TROPONINI 0.033*     ABGs:   Recent Labs   Lab 10/29/18  1744   PH 7.435   PCO2 34.9*   PO2 74*   HCO3 23.4*   POCSATURATED 95   BE 0     Microbiology Results (last 7 days)     ** No results found for the last 168 hours. **        Specimen (12h ago, onward)    None        Recent Labs   Lab 10/29/18  1439   COLORU Jeni   SPECGRAV >1.030*   PHUR 5.0   PROTEINUA 2+*   BACTERIA  Rare   NITRITE Negative   LEUKOCYTESUR Negative   UROBILINOGEN 4.0-6.0*   HYALINECASTS 2*       Diagnostic Results:    Current Facility-Administered Medications   Medication Dose Route Frequency Provider Last Rate Last Dose    acetaminophen tablet 650 mg  650 mg Oral Q4H PRN Дмитрий Limon MD        aspirin EC tablet 81 mg  81 mg Oral Daily Дмитрий Limon MD   81 mg at 10/31/18 0857    atropine injection 0.5 mg  0.5 mg Intravenous PRN Дмитрий Limon MD        carvedilol tablet 3.125 mg  3.125 mg Oral BID Дмитрий Limon MD   3.125 mg at 10/31/18 0857    cloNIDine tablet 0.2 mg  0.2 mg Oral Q4H PRN Jean Mireles MD        enoxaparin injection 40 mg  40 mg Subcutaneous Daily Fabián Chisholm MD   40 mg at 10/30/18 1728    furosemide tablet 40 mg  40 mg Oral BID Дмитрий Limon MD        HYDROcodone-acetaminophen 5-325 mg per tablet 1 tablet  1 tablet Oral Q4H PRN Дмитрий Limon MD        influenza (FLUZONE QUADRIVALENT) vaccine 0.5 mL  0.5 mL Intramuscular vaccine x 1 dose Jean Mireles MD        levothyroxine tablet 75 mcg  75 mcg Oral Daily Jean Mireles MD   75 mcg at 10/31/18 0542    liothyronine tablet 10 mcg  10 mcg Oral Daily Jean Mireles MD   10 mcg at 10/31/18 1059    losartan tablet 25 mg  25 mg Oral Once Дмитрий Limon MD        [START ON 11/1/2018] losartan tablet 50 mg  50 mg Oral Daily Дмитрий Limon MD        morphine injection 2 mg  2 mg Intravenous Q10 Min PRN Дмитрий Limon MD        ondansetron injection 4 mg  4 mg Intravenous Q12H PRN Дмитрий Limon MD        oxyCODONE-acetaminophen 5-325 mg per tablet 1 tablet  1 tablet Oral Q6H PRN Fabián Chisholm MD   1 tablet at 10/29/18 2123    sodium chloride 0.9% bolus 250 mL  250 mL Intravenous PRN Дмитрий Limon MD        spironolactone tablet 25 mg  25 mg Oral Daily Дмитрий Limon MD   25 mg at 10/31/18 0857     Diagnostic Results:  ECG (personally  reviewed tracings):  10/27/18 1400 SR 76, PRWP, PVC, inflat ST abnl ?isch     Chest X-Ray (personally reviewed image(s)): 10/27/18 CMeg without CHF     CTA Chest 10/27/18  -No pulmonary embolus is identified through the segmental level.  -Imaging findings suggestive for elevated right heart pressure with reflux of contrast material into the IVC and hepatic veins.  -Trace left and small right-sided pleural effusion with mild interstitial pulmonary edema.  -Mild abdominal ascites.       ASSESSMENT/PLAN:     1. Cardiomyopathy: bi ventricular:acute   - s/p cardiac cath    Reviewed 2-D Echo    · Left ventricle ejection fraction is severely decreased at 15%  · Severe global hypokinetic wall motion.  · The left ventricle cavity is severely dilated.  · Left ventricle shows eccentric hypertrophy.  · Grade III (severe) left ventricular diastolic dysfunction consistent with restrictive physiology.  · Right ventricular cavity size is mildly dilated.  · RV systolic function is moderately reduced.  · Moderate mitral regurgitation.  · Severe tricuspid regurgitation.  · The estimated PA systolic pressure is 39.36 mm Hg  · Trivial pericardial effusion without hemodynamic compromise       Due to pt's severity of symptoms, presentation and physical exam finding, pt need further work up. Pt has both LV and RV dysfxn- with new onset symptoms. Her mental status- orientation x 1 most of the time- pt may have hypoxia/hypoxemia.   Plan:   - Cardiology consulted for cardiomyopathy evaluation   - ABG- reviewed: pt has hypoxemia   - Waking desaturation   - pt's thyroid fxn- improving- TSH lagging behind    - discussed case with Dr. Limon and underwent cath       Cath 10/30/18 (images pers rev)  Pressures:  RA 16  RV 68/19  PA 67/26/39  PAWP 23  /20  Ao 138/76/98  Thermal CO 4.91 L/min  Sats:  RA 52.5%  RV 55.8%  PA 55.3%  FA 94%  Naye CO 2.7L/min  LVEF: 15% by echo  Wall Motion: severe global HK  Dominance:  Right  LM: normal  LAD: normal  LCx: normal  RCA: normal  Hemostasis:  RFA/V man compression  Impression:  Normal cors with severe LV dysfxn  Elevated LVEDP with mod-sev PHTN  Findings c/w NICM  Plan:  Cont Med rx:  ASA 81mg qd  IV lasix for goal net neg 1-2L/day  Cont coreg/ARB/aldact and titrate as HR/BP will allow  Lifevest ordered  Cont rx of hypothyroidism  Repeat echo 3 months (late Jan 2019) for reassessment of LVEF and need for ICD       LE venous US 10/27/18  No evidence of lower extremity deep venous thrombosis (bilat).      Active Hospital Problems     Diagnosis   POA    Hypertensive emergency [I16.1]  - with EKG changes and + trop  - pt chest pain free now   - bp waxing and waning   - CTA without PE  - adjust meds   Yes    Elevated troponin [R74.8]  - mild   - free of chest pain and sob      Yes    ST segment depression [R94.31]  - ? Due to bp  - EKG in the am and compare  - 2-D Echo done and reviewed  - cardiology consulted      Yes    Other specified hypothyroidism [E03.8]  - thyroid panel: TSH continue to be high but better  - pt to continue syn and cytomel as ordered           Yes    Elevated INR [R79.1]  - no bleeding  ? Hypoproteinemia  -UA + protein leak         Yes    Normocytic anemia [D64.9]   Yes    Bilateral leg edema [R60.0]  -   Yes    Hypertension [I10]   Yes       Resolved Hospital Problems   No resolved problems to display.      DVT pro: Lovenox      LifeVest order in process- Ok to dc home and it will be delivered tomorrow   Discussed with Dr. Limon and CM    Fabián Chisholm M.D  Internal Medicine & Geriatric Medicine  Hematology & Oncology  Palliative Medicine    [address] [facility], [address]  [place], [place] [id number] [phone] (Office)  [phone] (Fax)

## 2018-10-31 NOTE — NURSING
Received report from Meri NICHOLAS. Pt AAOX4. NAD noted. Telemetry monitor in use, alarm set. Angio seal noted to R-groin, pressure dressing intact. No bleeding noted. No c/o pain this time. Call light within reach. Will continue to monitor.

## 2018-10-31 NOTE — PLAN OF CARE
Problem: Hypertensive Disease/Crisis (Arterial) (Adult)  Intervention: Cluster Activity/Decrease Environmental Stimulation   10/30/18 1944   Cognitive Interventions   Sensory Stimulation Regulation auditory stimulation minimized;visual stimulation minimized;tactile stimulation minimized       Goal: Signs and Symptoms of Listed Potential Problems Will be Absent, Minimized or Managed (Hypertensive Disease/Crisis)  Signs and symptoms of listed potential problems will be absent, minimized or managed by discharge/transition of care (reference Hypertensive Disease/Crisis (Arterial) (Adult) CPG).   10/30/18 1944   Hypertensive Disease/Crisis (Arterial)   Problems Assessed (Hypertensive Disease/Crisis (Arterial)) situational response;severe hypertension/hypertensive crisis   Problems Present (Hypertensive Disease/Crisis (Arterial)) situational response;severe hypertension/hypertensive crisis

## 2018-10-31 NOTE — PLAN OF CARE
[facility]    HOME HEALTH ORDERS  FACE TO FACE ENCOUNTER    Patient Name: Vipin Whitaker  YOB: 1970    PCP: Rena Zafar MD   [facility] [address] [address]  PCP Phone Number: [phone]  PCP Fax: [phone]    Encounter Date: 10/31/2018    Admit to Home Health    Diagnoses:  Active Hospital Problems    Diagnosis  POA    Acute combined systolic and diastolic congestive heart failure [I50.41]  Yes    NICM (nonischemic cardiomyopathy) [I42.8]  Yes    Hypertensive emergency [I16.1]  Yes    Elevated troponin [R74.8]  Yes    ST segment depression [R94.31]  Yes    Other specified hypothyroidism [E03.8]  Yes    Elevated INR [R79.1]  Yes    Normocytic anemia [D64.9]  Yes    Bilateral leg edema [R60.0]  Yes    Hypertension [I10]  Yes    Hypothyroidism [E03.9]  Yes      Resolved Hospital Problems   No resolved problems to display.       Future Appointments   Date Time Provider Department Center   11/6/2018  1:45 PM Damion Bahena MD [facility] CARDIO [facility]   11/12/2018 11:00 AM Rena Zafar MD [facility]   11/12/2018 12:30 PM Kaya Chisholm MD [facility]     Follow-up Information     Rena Zafar MD. Schedule an appointment as soon as possible for a visit on 11/12/2018.    Specialty:  Internal Medicine  Why:  @11:00am, for Hospital Follow up  Contact information:  [address]  [place] [address]  [phone]             Damion Bahena MD. Schedule an appointment as soon as possible for a visit on 11/6/2018.    Specialty:  Cardiology  Why:  @1:45pm, for Hospital Follow up  Contact information:  [address]  [address]  [facility] [address]  [phone]             [facility].    Specialty:  Emergency Medicine  Why:  As needed, If symptoms worsen  Contact information:  [address]  [facility] [address] [phone]                   My clinical findings that support the need for the home health skilled  services and home bound status are the following:  Weakness/numbness causing balance and gait disturbance due to Heart Failure making it taxing to leave home.    Allergies:Review of patient's allergies indicates:  Not on File    Diet: cardiac diet    Activities: activity as tolerated    Nursing:   SN to complete comprehensive assessment including routine vital signs. Instruct on disease process and s/s of complications to report to MD. Review/verify medication list sent home with the patient at time of discharge  and instruct patient/caregiver as needed. Frequency may be adjusted depending on start of care date.    Notify MD if SBP > 160 or < 90; DBP > 90 or < 50; HR > 120 or < 50; Temp > 101      CONSULTS:    Physical Therapy to evaluate and treat. Evaluate for home safety and equipment needs; Establish/upgrade home exercise program. Perform / instruct on therapeutic exercises, gait training, transfer training, and Range of Motion.  Occupational Therapy to evaluate and treat. Evaluate home environment for safety and equipment needs. Perform/Instruct on transfers, ADL training, ROM, and therapeutic exercises.    MISCELLANEOUS CARE:  Heart Failure:      SN to instruct on the following:    Instruct on the definition of CHF.   Instruct on the signs/sympoms of CHF to be reported.   Instruct on and monitor daily weights.   Instruct on factors that cause exacerbation.   Instruct on action, dose, schedule, and side effects of medications.   Instruct on diet as prescribed.   Instruct on activity allowed.   Instruct on life-style modifications for life long management of CHF   SN to assess compliance with daily weights, diet, medications, fluid retention,    safety precautions, activities permitted and life-style modifications.   Additional 1-2 SN visits per week as needed for signs and symptoms     of CHF exacerbation.      For Weight Gain > 2-3 lbs in 1 day or 4-6 lbs over 1 week notify PCP:  Weigh patient with each visit.   If patient gains 3 pounds or greater overnight call Dr Bahena for directions      Medications: Review discharge medications with patient and family and provide education.         Vipin Whitaker   Home Medication Instructions [id number]    Printed on:10/31/18 1821   Medication Information                      carvedilol (COREG) 3.125 MG tablet  Take 1 tablet (3.125 mg total) by mouth 2 (two) times daily.             cloNIDine (CATAPRES) 0.1 MG tablet  Take 2 tablets (0.2 mg total) by mouth 2 (two) times daily.             furosemide (LASIX) 40 MG tablet  Take 1 tablet (40 mg total) by mouth 2 (two) times daily.             levothyroxine (SYNTHROID) 75 MCG tablet  Take 1 tablet (75 mcg total) by mouth once daily.             liothyronine (CYTOMEL) 5 MCG Tab  Take 2 tablets (10 mcg total) by mouth once daily.             losartan (COZAAR) 50 MG tablet  Take 1 tablet (50 mg total) by mouth once daily.             oxyCODONE-acetaminophen (PERCOCET) 5-325 mg per tablet  Take 1 tablet by mouth every 6 (six) hours as needed.             spironolactone (ALDACTONE) 25 MG tablet  Take 1 tablet (25 mg total) by mouth once daily.                   I certify that this patient is confined to her home and needs intermittent skilled nursing care, physical therapy and occupational therapy.

## 2018-10-31 NOTE — PLAN OF CARE
Problem: Patient Care Overview  Goal: Plan of Care Review  Outcome: Ongoing (interventions implemented as appropriate)   10/31/18 1543   Coping/Psychosocial   Plan Of Care Reviewed With patient;spouse       Problem: Fall Risk (Adult)  Goal: Identify Related Risk Factors and Signs and Symptoms  Related risk factors and signs and symptoms are identified upon initiation of Human Response Clinical Practice Guideline (CPG)  Outcome: Ongoing (interventions implemented as appropriate)   10/31/18 1543   Fall Risk   Related Risk Factors (Fall Risk) polypharmacy   Signs and Symptoms (Fall Risk) presence of risk factors       Problem: Hypertensive Disease/Crisis (Arterial) (Adult)  Intervention: Cluster Activity/Decrease Environmental Stimulation   10/31/18 1543   Cognitive Interventions   Sensory Stimulation Regulation auditory stimulation minimized;care clustered;lighting decreased;quiet environment promoted       Goal: Signs and Symptoms of Listed Potential Problems Will be Absent, Minimized or Managed (Hypertensive Disease/Crisis)  Signs and symptoms of listed potential problems will be absent, minimized or managed by discharge/transition of care (reference Hypertensive Disease/Crisis (Arterial) (Adult) CPG).  Outcome: Ongoing (interventions implemented as appropriate)   10/31/18 1543   Hypertensive Disease/Crisis (Arterial)   Problems Assessed (Hypertensive Disease/Crisis (Arterial)) severe hypertension/hypertensive crisis;situational response   Problems Present (Hypertensive Disease/Crisis (Arterial)) severe hypertension/hypertensive crisis;situational response

## 2018-10-31 NOTE — SUBJECTIVE & OBJECTIVE
Past Medical History:   Diagnosis Date    Abnormal Pap smear     Acute combined systolic and diastolic congestive heart failure 10/30/2018    Depression     Hypertension 10/27/2018    Normocytic anemia 10/28/2018    Thyroid disease        Past Surgical History:   Procedure Laterality Date    CATHETERIZATION OF BOTH LEFT AND RIGHT HEART Bilateral 10/30/2018    Procedure: CATHETERIZATION, HEART, BOTH LEFT AND RIGHT with sat run. RFA access;  Surgeon: Дмитрий Limon MD;  Location: [facility] CATH LAB;  Service: Cardiology;  Laterality: Bilateral;    CATHETERIZATION, HEART, BOTH LEFT AND RIGHT with sat run. RFA access Bilateral 10/30/2018    Performed by Дмитрий Limon MD at [facility] CATH LAB    COLONOSCOPY N/A 11/3/2017    Procedure: COLONOSCOPY;  Surgeon: Leighton Ross MD;  Location: [facility] ENDO;  Service: Endoscopy;  Laterality: N/A;    COLONOSCOPY N/A 11/3/2017    Performed by Leighton Ross MD at [facility] ENDO    SALPINGECTOMY         Review of patient's allergies indicates:  Not on File    No current facility-administered medications on file prior to encounter.      Current Outpatient Medications on File Prior to Encounter   Medication Sig    levothyroxine (SYNTHROID) 75 MCG tablet Take 1 tablet (75 mcg total) by mouth once daily.    propranolol (INDERAL) 20 MG tablet Take 1 tablet (20 mg total) by mouth 2 (two) times daily.    liothyronine (CYTOMEL) 5 MCG Tab Take 2 tablets (10 mcg total) by mouth once daily.    propranolol (INDERAL) 20 MG tablet TAKE 1 TABLET (20 MG TOTAL) BY MOUTH 3 (THREE) TIMES DAILY.     Family History     Problem Relation (Age of Onset)    Cancer Sister, Maternal Aunt    Colon cancer Mother    Heart murmur Maternal Uncle    Hypertension Mother    No Known Problems Father, Brother        Tobacco Use    Smoking status: Never Smoker   Substance and Sexual Activity    Alcohol use: No    Drug use: No    Sexual activity: Yes     Partners: Male     Birth control/protection:  None     Review of Systems   Gastrointestinal: Negative for melena.   Genitourinary: Negative for hematuria.     Objective:     Vital Signs (Most Recent):  Temp: 98.6 °F (37 °C) (10/31/18 0721)  Pulse: 75 (10/31/18 0721)  Resp: 18 (10/31/18 0721)  BP: (!) 161/103 (10/31/18 0721)  SpO2: 98 % (10/31/18 0721) Vital Signs (24h Range):  Temp:  [97.4 °F (36.3 °C)-98.6 °F (37 °C)] 98.6 °F (37 °C)  Pulse:  [60-82] 75  Resp:  [16-18] 18  SpO2:  [93 %-100 %] 98 %  BP: (138-165)/() 161/103     Weight: 66 kg (145 lb 8.1 oz)  Body mass index is 26.61 kg/m².    SpO2: 98 %  O2 Device (Oxygen Therapy): room air      Intake/Output Summary (Last 24 hours) at 10/31/2018 0947  Last data filed at 10/30/2018 1730  Gross per 24 hour   Intake 360 ml   Output 900 ml   Net -540 ml       Lines/Drains/Airways     Peripheral Intravenous Line                 Peripheral IV - Single Lumen 10/27/18 1400 Antecubital 3 days                Physical Exam   Constitutional: She is oriented to person, place, and time. She appears well-developed and well-nourished. No distress.   HENT:   Head: Normocephalic and atraumatic.   Mouth/Throat: No oropharyngeal exudate.   Eyes: Conjunctivae and EOM are normal. Pupils are equal, round, and reactive to light. No scleral icterus.   Neck: Normal range of motion. Neck supple. No JVD present. No tracheal deviation present. No thyromegaly present.   Cardiovascular: Normal rate, regular rhythm, S1 normal and S2 normal. Exam reveals no gallop and no friction rub.   No murmur heard.  R groin acces site c/d/i.   Pulmonary/Chest: Effort normal and breath sounds normal. No respiratory distress. She has no wheezes. She has no rales. She exhibits no tenderness.   Abdominal: Soft. She exhibits no distension.   Musculoskeletal: Normal range of motion. She exhibits no edema.   Neurological: She is alert and oriented to person, place, and time. No cranial nerve deficit.   Skin: Skin is warm and dry. She is not diaphoretic.    Psychiatric: She has a normal mood and affect. Her behavior is normal. Judgment normal.       Current Medications:   aspirin  81 mg Oral Daily    carvedilol  3.125 mg Oral BID    enoxaparin  40 mg Subcutaneous Daily    furosemide  20 mg Intravenous BID    levothyroxine  75 mcg Oral Daily    liothyronine  10 mcg Oral Daily    losartan  25 mg Oral Daily    spironolactone  25 mg Oral Daily       acetaminophen, atropine, cloNIDine, HYDROcodone-acetaminophen, influenza, morphine, ondansetron, oxyCODONE-acetaminophen, sodium chloride 0.9%    Laboratory:  CBC:  Recent Labs   Lab 05/07/18  0711 10/15/18  1240 10/27/18  1405 10/30/18  0507 10/31/18  0729   WHITE BLOOD CELL COUNT 6.01 5.06 5.22 4.28 4.90   HEMOGLOBIN 13.1 13.1 11.5 L 11.6 L 12.6   HEMATOCRIT 42.5 39.4 35.0 L 34.8 L 39.2   PLATELETS 280 276 258 227 230       CHEMISTRIES:  Recent Labs   Lab 10/27/18  1405 10/28/18  0631 10/29/18  0641 10/30/18  0507 10/31/18  0729   GLUCOSE 108 90 89 109  107 86   SODIUM 145 142 139 140  140 140   POTASSIUM 3.2 L 4.2 3.3 L 3.7  3.7 3.2 L   BUN BLD 12 10 10 15  15 13   CREATININE 0.9 0.9 0.8 0.8  0.8 0.8   EGFR IF AFRICAN AMERICAN >60 >60 >60 >60  >60 >60   EGFR IF NON- >60 >60 >60 >60  >60 >60   CALCIUM 9.2 8.5 L 8.7 9.0  8.9 8.5 L       CARDIAC BIOMARKERS:  Recent Labs   Lab 10/27/18  1405 10/27/18  2004 10/28/18  0130 10/28/18  1239   TROPONIN I 0.031 H 0.025 0.033 H 0.033 H       COAGS:  Recent Labs   Lab 10/27/18  1405   INR 1.5 H       LIPIDS/LFTS:  Recent Labs   Lab 05/05/17  0900 04/25/18  1041 05/07/18  0711 10/15/18  1240 10/30/18  0507   CHOLESTEROL 117 L  --  150  --   --    TRIGLYCERIDES 57  --  86  --   --    HDL 48  --  55  --   --    LDL CHOLESTEROL 57.6 L  --  77.8  --   --    NON-HDL CHOLESTEROL 69  --  95  --   --    AST 20 16 25 24 51 H   ALT 29 20 32 18 73 H       BNP:        TSH:  Recent Labs   Lab 08/10/17  1500 04/25/18  1041 05/07/18  0711 10/15/18  1240 10/28/18  1239    TSH <0.010 L 0.489 15.820 H 80.344 H 21.868 H       Free T4:  Recent Labs   Lab 05/05/17  0900 08/10/17  1500 05/07/18  0711 10/15/18  1240 10/28/18  1239   FREE T4 2.52 H 3.41 H 0.75 <0.40 L 0.72       Diagnostic Results:  ECG (personally reviewed tracings):  10/27/18 1400 SR 76, PRWP, PVC, inflat ST abnl ?isch    Chest X-Ray (personally reviewed image(s)): 10/27/18 CMeg without CHF    CTA Chest 10/27/18  -No pulmonary embolus is identified through the segmental level.  -Imaging findings suggestive for elevated right heart pressure with reflux of contrast material into the IVC and hepatic veins.  -Trace left and small right-sided pleural effusion with mild interstitial pulmonary edema.  -Mild abdominal ascites.    LE venous US 10/27/18  No evidence of lower extremity deep venous thrombosis (bilat).    Cath 10/30/18 (images pers rev)  Pressures:  RA 16  RV 68/19  PA 67/26/39  PAWP 23  /20  Ao 138/76/98  Thermal CO 4.91 L/min  Sats:  RA 52.5%  RV 55.8%  PA 55.3%  FA 94%  Naye CO 2.7L/min  LVEF: 15% by echo  Wall Motion: severe global HK  Dominance: Right  LM: normal  LAD: normal  LCx: normal  RCA: normal  Hemostasis:  RFA/V man compression  Impression:  Normal cors with severe LV dysfxn  Elevated LVEDP with mod-sev PHTN  Findings c/w NICM  Plan:  Cont Med rx:  ASA 81mg qd  IV lasix for goal net neg 1-2L/day  Cont coreg/ARB/aldact and titrate as HR/BP will allow  Lifevest ordered  Cont rx of hypothyroidism  Repeat echo 3 months (late Jan 2019) for reassessment of LVEF and need for ICD    Echo: 10/29/18  · Left ventricle ejection fraction is severely decreased at 15%  · Severe global hypokinetic wall motion.  · The left ventricle cavity is severely dilated.  · Left ventricle shows eccentric hypertrophy.  · Grade III (severe) left ventricular diastolic dysfunction consistent with restrictive physiology.  · Right ventricular cavity size is mildly dilated.  · RV systolic function is moderately reduced.  · Moderate  mitral regurgitation.  · Severe tricuspid regurgitation.  · The estimated PA systolic pressure is 39.36 mm Hg  · Trivial pericardial effusion without hemodynamic compromise

## 2018-10-31 NOTE — PROGRESS NOTES
[facility]  Cardiology  Progress Note    Patient Name: Vipin Whitaker  MRN: [id number]  Admission Date: 10/27/2018  Hospital Length of Stay: 2 days  Code Status: Full Code   Attending Physician: Fabián Chisholm MD   Primary Care Physician: Rena Zafar MD  Expected Discharge Date:   Principal Problem:<principal problem not specified>    Subjective:     Hospital Course:   10/30/18: cath with NICM    Interval Hx: no cp, sob/LE edema resolved    Tele: SR (pers rev)      Past Medical History:   Diagnosis Date    Abnormal Pap smear     Acute combined systolic and diastolic congestive heart failure 10/30/2018    Depression     Hypertension 10/27/2018    Normocytic anemia 10/28/2018    Thyroid disease        Past Surgical History:   Procedure Laterality Date    CATHETERIZATION OF BOTH LEFT AND RIGHT HEART Bilateral 10/30/2018    Procedure: CATHETERIZATION, HEART, BOTH LEFT AND RIGHT with sat run. RFA access;  Surgeon: Дмитрий Limon MD;  Location: [facility] CATH LAB;  Service: Cardiology;  Laterality: Bilateral;    CATHETERIZATION, HEART, BOTH LEFT AND RIGHT with sat run. RFA access Bilateral 10/30/2018    Performed by Дмитрий Limon MD at [facility] CATH LAB    COLONOSCOPY N/A 11/3/2017    Procedure: COLONOSCOPY;  Surgeon: Leighton Ross MD;  Location: [facility] ENDO;  Service: Endoscopy;  Laterality: N/A;    COLONOSCOPY N/A 11/3/2017    Performed by Leighton Ross MD at [facility] ENDO    SALPINGECTOMY         Review of patient's allergies indicates:  Not on File    No current facility-administered medications on file prior to encounter.      Current Outpatient Medications on File Prior to Encounter   Medication Sig    levothyroxine (SYNTHROID) 75 MCG tablet Take 1 tablet (75 mcg total) by mouth once daily.    propranolol (INDERAL) 20 MG tablet Take 1 tablet (20 mg total) by mouth 2 (two) times daily.    liothyronine (CYTOMEL) 5 MCG Tab Take 2 tablets (10 mcg total) by mouth once daily.     propranolol (INDERAL) 20 MG tablet TAKE 1 TABLET (20 MG TOTAL) BY MOUTH 3 (THREE) TIMES DAILY.     Family History     Problem Relation (Age of Onset)    Cancer Sister, Maternal Aunt    Colon cancer Mother    Heart murmur Maternal Uncle    Hypertension Mother    No Known Problems Father, Brother        Tobacco Use    Smoking status: Never Smoker   Substance and Sexual Activity    Alcohol use: No    Drug use: No    Sexual activity: Yes     Partners: Male     Birth control/protection: None     Review of Systems   Gastrointestinal: Negative for melena.   Genitourinary: Negative for hematuria.     Objective:     Vital Signs (Most Recent):  Temp: 98.6 °F (37 °C) (10/31/18 0721)  Pulse: 75 (10/31/18 0721)  Resp: 18 (10/31/18 0721)  BP: (!) 161/103 (10/31/18 0721)  SpO2: 98 % (10/31/18 0721) Vital Signs (24h Range):  Temp:  [97.4 °F (36.3 °C)-98.6 °F (37 °C)] 98.6 °F (37 °C)  Pulse:  [60-82] 75  Resp:  [16-18] 18  SpO2:  [93 %-100 %] 98 %  BP: (138-165)/() 161/103     Weight: 66 kg (145 lb 8.1 oz)  Body mass index is 26.61 kg/m².    SpO2: 98 %  O2 Device (Oxygen Therapy): room air      Intake/Output Summary (Last 24 hours) at 10/31/2018 0947  Last data filed at 10/30/2018 1730  Gross per 24 hour   Intake 360 ml   Output 900 ml   Net -540 ml       Lines/Drains/Airways     Peripheral Intravenous Line                 Peripheral IV - Single Lumen 10/27/18 1400 Antecubital 3 days                Physical Exam   Constitutional: She is oriented to person, place, and time. She appears well-developed and well-nourished. No distress.   HENT:   Head: Normocephalic and atraumatic.   Mouth/Throat: No oropharyngeal exudate.   Eyes: Conjunctivae and EOM are normal. Pupils are equal, round, and reactive to light. No scleral icterus.   Neck: Normal range of motion. Neck supple. No JVD present. No tracheal deviation present. No thyromegaly present.   Cardiovascular: Normal rate, regular rhythm, S1 normal and S2 normal. Exam reveals  no gallop and no friction rub.   No murmur heard.  R groin acces site c/d/i.   Pulmonary/Chest: Effort normal and breath sounds normal. No respiratory distress. She has no wheezes. She has no rales. She exhibits no tenderness.   Abdominal: Soft. She exhibits no distension.   Musculoskeletal: Normal range of motion. She exhibits no edema.   Neurological: She is alert and oriented to person, place, and time. No cranial nerve deficit.   Skin: Skin is warm and dry. She is not diaphoretic.   Psychiatric: She has a normal mood and affect. Her behavior is normal. Judgment normal.       Current Medications:   aspirin  81 mg Oral Daily    carvedilol  3.125 mg Oral BID    enoxaparin  40 mg Subcutaneous Daily    furosemide  20 mg Intravenous BID    levothyroxine  75 mcg Oral Daily    liothyronine  10 mcg Oral Daily    losartan  25 mg Oral Daily    spironolactone  25 mg Oral Daily       acetaminophen, atropine, cloNIDine, HYDROcodone-acetaminophen, influenza, morphine, ondansetron, oxyCODONE-acetaminophen, sodium chloride 0.9%    Laboratory:  CBC:  Recent Labs   Lab 05/07/18  0711 10/15/18  1240 10/27/18  1405 10/30/18  0507 10/31/18  0729   WHITE BLOOD CELL COUNT 6.01 5.06 5.22 4.28 4.90   HEMOGLOBIN 13.1 13.1 11.5 L 11.6 L 12.6   HEMATOCRIT 42.5 39.4 35.0 L 34.8 L 39.2   PLATELETS 280 276 258 227 230       CHEMISTRIES:  Recent Labs   Lab 10/27/18  1405 10/28/18  0631 10/29/18  0641 10/30/18  0507 10/31/18  0729   GLUCOSE 108 90 89 109  107 86   SODIUM 145 142 139 140  140 140   POTASSIUM 3.2 L 4.2 3.3 L 3.7  3.7 3.2 L   BUN BLD 12 10 10 15  15 13   CREATININE 0.9 0.9 0.8 0.8  0.8 0.8   EGFR IF AFRICAN AMERICAN >60 >60 >60 >60  >60 >60   EGFR IF NON- >60 >60 >60 >60  >60 >60   CALCIUM 9.2 8.5 L 8.7 9.0  8.9 8.5 L       CARDIAC BIOMARKERS:  Recent Labs   Lab 10/27/18  1405 10/27/18  2004 10/28/18  0130 10/28/18  1239   TROPONIN I 0.031 H 0.025 0.033 H 0.033 H       COAGS:  Recent Labs   Lab  10/27/18  1405   INR 1.5 H       LIPIDS/LFTS:  Recent Labs   Lab 05/05/17  0900 04/25/18  1041 05/07/18  0711 10/15/18  1240 10/30/18  0507   CHOLESTEROL 117 L  --  150  --   --    TRIGLYCERIDES 57  --  86  --   --    HDL 48  --  55  --   --    LDL CHOLESTEROL 57.6 L  --  77.8  --   --    NON-HDL CHOLESTEROL 69  --  95  --   --    AST 20 16 25 24 51 H   ALT 29 20 32 18 73 H       BNP:        TSH:  Recent Labs   Lab 08/10/17  1500 04/25/18  1041 05/07/18  0711 10/15/18  1240 10/28/18  1239   TSH <0.010 L 0.489 15.820 H 80.344 H 21.868 H       Free T4:  Recent Labs   Lab 05/05/17  0900 08/10/17  1500 05/07/18  0711 10/15/18  1240 10/28/18  1239   FREE T4 2.52 H 3.41 H 0.75 <0.40 L 0.72       Diagnostic Results:  ECG (personally reviewed tracings):  10/27/18 1400 SR 76, PRWP, PVC, inflat ST abnl ?isch    Chest X-Ray (personally reviewed image(s)): 10/27/18 CMeg without CHF    CTA Chest 10/27/18  -No pulmonary embolus is identified through the segmental level.  -Imaging findings suggestive for elevated right heart pressure with reflux of contrast material into the IVC and hepatic veins.  -Trace left and small right-sided pleural effusion with mild interstitial pulmonary edema.  -Mild abdominal ascites.    LE venous US 10/27/18  No evidence of lower extremity deep venous thrombosis (bilat).    Cath 10/30/18 (images pers rev)  Pressures:  RA 16  RV 68/19  PA 67/26/39  PAWP 23  /20  Ao 138/76/98  Thermal CO 4.91 L/min  Sats:  RA 52.5%  RV 55.8%  PA 55.3%  FA 94%  Naye CO 2.7L/min  LVEF: 15% by echo  Wall Motion: severe global HK  Dominance: Right  LM: normal  LAD: normal  LCx: normal  RCA: normal  Hemostasis:  RFA/V man compression  Impression:  Normal cors with severe LV dysfxn  Elevated LVEDP with mod-sev PHTN  Findings c/w NICM  Plan:  Cont Med rx:  ASA 81mg qd  IV lasix for goal net neg 1-2L/day  Cont coreg/ARB/aldact and titrate as HR/BP will allow  Lifevest ordered  Cont rx of hypothyroidism  Repeat echo 3  months (late Jan 2019) for reassessment of LVEF and need for ICD    Echo: 10/29/18  · Left ventricle ejection fraction is severely decreased at 15%  · Severe global hypokinetic wall motion.  · The left ventricle cavity is severely dilated.  · Left ventricle shows eccentric hypertrophy.  · Grade III (severe) left ventricular diastolic dysfunction consistent with restrictive physiology.  · Right ventricular cavity size is mildly dilated.  · RV systolic function is moderately reduced.  · Moderate mitral regurgitation.  · Severe tricuspid regurgitation.  · The estimated PA systolic pressure is 39.36 mm Hg  · Trivial pericardial effusion without hemodynamic compromise        Assessment and Plan:     Acute combined systolic and diastolic congestive heart failure    Severe bivent dysfxn in setting of hyper->hypothyroidism  Currently on rx for hypothyroidism with normal T4  Followed by Dr. HAZEL Chisholm  Cath 10/30/18 c/w NICM and elev LVEDP with secondary PHTN  Cont Med rx:  ASA 81mg qd  Change lasix to 40mg po bid  Titrate losartan to 50mg qd  Replete K+ (ordered)  Cont Coreg/Aldact  Lifevest ordered  Cont rx of hypothyroidism  Repeat echo 3 months (late Jan 2019) for reassessment of LVEF and need for ICD       Elevated troponin    As above, NICM on cath     Hypertension    Cont med rx       Hypothyroidism    Per IM         VTE Risk Mitigation (From admission, onward)        Ordered     enoxaparin injection 40 mg  Daily      10/29/18 0908     IP VTE LOW RISK PATIENT  Once      10/27/18 1913     Place sequential compression device  Until discontinued      10/27/18 1913     Place PEMA hose  Until discontinued      10/27/18 1913        Dispo planning appropriate  Cardiology will sign off, pls call with questions.  Lifevest ordered, can be placed as outpatient.  Pt to follow up with me in 1-2 weeks.    Дмитрий Limon MD  Cardiology  [facility]

## 2018-11-01 LAB
ANA SER QL IF: NORMAL
ENA SCL70 AB SER-ACNC: 3 UNITS

## 2018-11-01 NOTE — PHYSICIAN QUERY
PT Name: Vipin Whitaker  MR #: [id number]     Physician Query Form - Documentation Clarification      CDS/: Samuel Adame Jr, RN              Contact information:[email or web address]    This form is a permanent document in the medical record.     Query Date: November 1, 2018    By submitting this query, we are merely seeking further clarification of documentation. Please utilize your independent clinical judgment when addressing the question(s) below.    The Medical record reflects the following:    Supporting Clinical Findings Location in Medical Record   The left ventricle cavity is severely dilated.  Left ventricle shows eccentric hypertrophy.     PRINCIPAL PROBLEM:  Acute combined systolic and diastolic congestive heart failure     NICM (nonischemic cardiomyopathy) 10/29 Transthoracic Echo Report      10/31 Discharge Summary      10/31 Discharge Summary                                                                                      Doctor, Please specify diagnosis or diagnoses associated with above clinical findings.  Further Specify the Nonischemic Cardiomyopathy    Provider Use Only    (  x  )Nonischemic Dilated Cardiomyopathy    (    )Nonischemic Hypertrophic Cardiomyopathy    (    )Other__________________________________________________                                                                                                              Clinically Undetermined

## 2018-11-01 NOTE — PHYSICIAN QUERY
PT Name: Vipin Whitaker  MR #: [id number]     Physician Query Form - Documentation Clarification      CDS/: Samuel Adame Jr, RN               Contact information:[email or web address]    This form is a permanent document in the medical record.     Query Date: November 1, 2018    By submitting this query, we are merely seeking further clarification of documentation. Please utilize your independent clinical judgment when addressing the question(s) below.    The Medical record reflects the following:    Supporting Clinical Findings Location in Medical Record   Potassium=3.2---->4.2---->3.3    potassium chloride 10% oral solution 40 mEq Once    potassium chloride SA CR tablet 40 mEq  Once 10/27-10/29 Labs    10/27 MAR    10/31 MAR                                                                                      Doctor, Please specify diagnosis or diagnoses associated with above clinical findings.  Specify the Clinical Significance of the above labs    Provider Use Only    ( x   )Hypokalemia    (    )No Clinical Significance     (    )Other______________________________________________________                                                                                                              Clinically Undetermined

## 2018-11-01 NOTE — DISCHARGE SUMMARY
[facility] IM  Discharge Summary      Patient Name: Vipin Whitaker  MRN: [id number]  Admission Date: 10/27/2018  Hospital Length of Stay: 2 days  Discharge Date and Time: 10/31/2018  8:04 PM  Attending Physician: No att. providers found   Discharging Provider: Fabián Chisholm MD  Primary Care Provider: Rena Zafar MD    HPI:     Mrs. Whitaker 48 YO lady with most recent onset of Graves dis leading to hypothyroidism. Pt was started on Synthroid and added cytomel most recently. Has been having bilateral LE edema and elevated bp. Pt came to [facility] ED for further evaluation. Work up revealed slightly elevated bp and abnormal EKG. Pt dose significant chest pain or sob. Valentino LE edema has been improving. Family also reports changes in memory.   Pt endorsed compliance with all medications.            Procedure(s) (LRB):  CATHETERIZATION, HEART, BOTH LEFT AND RIGHT with sat run. RFA access (Bilateral)     Hospital Course:     During this hospitalization, these following conditions were addressed and managed along with other comorbid conditions:      1. Cardiomyopathy: bi ventricular:acute   Reviewed 2-D Echo    · Left ventricle ejection fraction is severely decreased at 15%  · Severe global hypokinetic wall motion.  · The left ventricle cavity is severely dilated.  · Left ventricle shows eccentric hypertrophy.  · Grade III (severe) left ventricular diastolic dysfunction consistent with restrictive physiology.  · Right ventricular cavity size is mildly dilated.  · RV systolic function is moderately reduced.  · Moderate mitral regurgitation.  · Severe tricuspid regurgitation.  · The estimated PA systolic pressure is 39.36 mm Hg  · Trivial pericardial effusion without hemodynamic compromise       Due to pt's severity of symptoms, presentation and physical exam finding, pt need further work up. Pt has both LV and RV dysfxn- with new onset symptoms. Her mental status- orientation x 1 most of the time- pt may  have hypoxia/hypoxemia.   Plan:   - Cardiology consulted for cardiomyopathy evaluation   - ABG- reviewed: pt has hypoxemia   - Waking desaturation   - pt's thyroid fxn- improving- TSH lagging behind    - discussed case with Dr. Limon and underwent cath     Per Dr. Limon   Cath 10/30/18 (images pers rev)  Pressures:  RA 16  RV 68/19  PA 67/26/39  PAWP 23  /20  Ao 138/76/98  Thermal CO 4.91 L/min  Sats:  RA 52.5%  RV 55.8%  PA 55.3%  FA 94%  Naye CO 2.7L/min  LVEF: 15% by echo  Wall Motion: severe global HK  Dominance: Right  LM: normal  LAD: normal  LCx: normal  RCA: normal  Hemostasis:  RFA/V man compression  Impression:  Normal cors with severe LV dysfxn  Elevated LVEDP with mod-sev PHTN  Findings c/w NICM  Plan:  Cont Med rx:  ASA 81mg qd  IV lasix for goal net neg 1-2L/day  Cont coreg/ARB/aldact and titrate as HR/BP will allow  Lifevest ordered  Cont rx of hypothyroidism  Repeat echo 3 months (late Jan 2019) for reassessment of LVEF and need for ICD         Active Hospital Problems     Diagnosis   POA    Hypertensive emergency [I16.1]  - with EKG changes and + trop  - pt chest pain free now   - bp waxing and waning   - CTA without PE  - adjust meds   Yes    Elevated troponin [R74.8]  - mild   - free of chest pain and sob      Yes    ST segment depression [R94.31]  - ? Due to bp  - EKG in the am and compare  - 2-D Echo done and reviewed  - cardiology consulted      Yes    Other specified hypothyroidism [E03.8]  - thyroid panel: TSH continue to be high but better  - pt to continue syn and cytomel as ordered           Yes    Elevated INR [R79.1]  - no bleeding  ? Hypoproteinemia  -UA + protein leak         Yes    Normocytic anemia [D64.9]   Yes    Bilateral leg edema [R60.0]  -   Yes    Hypertension [I10]   Yes       Resolved Hospital Problems   No resolved problems to display.      DVT pro: Lovenox      LifeVest order in process- Ok to dc home and it will be delivered tomorrow   Discussed with   Ashly and CM    Consults:   Consults (From admission, onward)        Status Ordering Provider     Inpatient consult to Cardiology  Once     Provider:  Дмитрий Hernandez MD    Completed MARZENA MILLER     Inpatient consult to Social Work  Once     Provider:  (Not yet assigned)    Completed ДМИТРИЙ HERNANDEZ     Inpatient consult to Social Work  Once     Provider:  (Not yet assigned)    Acknowledged ДМИТРИЙ HERNANDEZ     IP consult to case management  Once     Provider:  (Not yet assigned)    Acknowledged DOMINICK PEREZ          Significant Diagnostic Studies: see above    Pending Diagnostic Studies:     Procedure Component Value Units Date/Time    CLEOPATRA [[id number]] Collected:  10/31/18 0729    Order Status:  Sent Lab Status:  In process Updated:  10/31/18 1359    Specimen:  Blood     Anti-scleroderma antibody [[id number]] Collected:  10/31/18 0729    Order Status:  Sent Lab Status:  In process Updated:  10/31/18 0802    Specimen:  Blood         Final Active Diagnoses:    Diagnosis Date Noted POA    PRINCIPAL PROBLEM:  Acute combined systolic and diastolic congestive heart failure [I50.41] 10/30/2018 Yes    NICM (nonischemic cardiomyopathy) [I42.8] 10/30/2018 Yes    Hypertensive emergency [I16.1] 10/28/2018 Yes    Elevated troponin [R74.8] 10/28/2018 Yes    ST segment depression [R94.31] 10/28/2018 Yes    Other specified hypothyroidism [E03.8] 10/28/2018 Yes    Elevated INR [R79.1] 10/28/2018 Yes    Normocytic anemia [D64.9] 10/28/2018 Yes    Bilateral leg edema [R60.0] 10/28/2018 Yes    Hypertension [I10] 10/27/2018 Yes    Hypothyroidism [E03.9] 10/18/2018 Yes      Problems Resolved During this Admission:      Discharged Condition: stable     Activity: as tolerated    Diet: cardiac    Disposition: Home or Self Care    Follow Up:  Follow-up Information     Rena Zafar MD. Schedule an appointment as soon as possible for a visit on 11/12/2018.    Specialty:  Internal Medicine  Why:  @11:00am,  for Hospital Follow up  Contact information:  [address]  [place] [address]  [phone]             Damion Bahena MD On 11/6/2018.    Specialty:  Cardiology  Why:  @1:45pm, for Hospital Follow up  Contact information:  [address]  [address]  [place] [address]  [phone]             [facility].    Specialty:  Emergency Medicine  Why:  As needed, If symptoms worsen  Contact information:  [address]  [place] [address] [phone]           [facility].    Specialty:  Home Health Services  Why:  Home Health  Contact information:  [address]  [address]  [place] [id number] [phone]             Lifevest.    Why:  Will be delivered to your home once approved by insurance               Patient Instructions:   No discharge procedures on file.  Medications:  Reconciled Home Medications:      Medication List      START taking these medications    carvedilol 3.125 MG tablet  Commonly known as:  COREG  Take 1 tablet (3.125 mg total) by mouth 2 (two) times daily.     cloNIDine 0.1 MG tablet  Commonly known as:  CATAPRES  Take 2 tablets (0.2 mg total) by mouth 2 (two) times daily.     furosemide 40 MG tablet  Commonly known as:  LASIX  Take 1 tablet (40 mg total) by mouth 2 (two) times daily.     losartan 50 MG tablet  Commonly known as:  COZAAR  Take 1 tablet (50 mg total) by mouth once daily.  Start taking on:  11/1/2018     oxyCODONE-acetaminophen 5-325 mg per tablet  Commonly known as:  PERCOCET  Take 1 tablet by mouth every 6 (six) hours as needed.     spironolactone 25 MG tablet  Commonly known as:  ALDACTONE  Take 1 tablet (25 mg total) by mouth once daily.  Start taking on:  11/1/2018        CONTINUE taking these medications    levothyroxine 75 MCG tablet  Commonly known as:  SYNTHROID  Take 1 tablet (75 mcg total) by mouth once daily.     liothyronine 5 MCG Tab  Commonly known as:  CYTOMEL  Take 2 tablets (10 mcg total) by mouth once  daily.        STOP taking these medications    propranolol 20 MG tablet  Commonly known as:  DIYA Chisholm M.D  Internal Medicine & Geriatric Medicine  Hematology & Oncology  Palliative Medicine    [address], [address]  [place] [id number] [phone] (Office)  [phone] (Fax)

## 2018-11-01 NOTE — PLAN OF CARE
11/01/18 1111   Post-Acute Status   Post-Acute Authorization Home Health/Hospice   Home Health/Hospice Status Referrals Sent   Patient returned home after hours.  She will receive services from [facility].  [place] notified Christine GARSIA this am that patient will need to receive lifevest at home.

## 2018-11-02 ENCOUNTER — NURSE TRIAGE (OUTPATIENT)
Dept: ADMINISTRATIVE | Facility: CLINIC | Age: 48
End: 2018-11-02

## 2018-11-02 ENCOUNTER — PATIENT OUTREACH (OUTPATIENT)
Dept: ADMINISTRATIVE | Facility: CLINIC | Age: 48
End: 2018-11-02

## 2018-11-02 NOTE — PROGRESS NOTES
"C3 nurse contacted [facility] as pt and  reporting no one has contacted us about HH.  C3 nurse spoke with Sekou and request nurse to contact patient at .    C3 nurse contacted [employer] at [phone] and spoke with representative who had minimal information but did advise [employer] [place] had denied; Rep gave me Mathew RAMOS ([facility] manager) to contact @ [phone] for further information.    C3 nurse contacted T and reported that [employer] had denied the life vest yesterday evening and would contact Dr Limon on Monday requesting a peer to peer review with [employer].     C3 nurse contacted the # listed for Dr Limon to notify that patient's insurance denied approval for life vest and pt will not have vest as instructed, which was "to be delivered 11/1 to pt's home."    C3 nurse called patient back to inform of above as  was concerned that his wife did not have the lifevest. Verbalized understanding.  "

## 2018-11-02 NOTE — PATIENT INSTRUCTIONS
"Discharge Instructions for Heart Failure  You have been diagnosed with heart failure. The term "heart failure" sounds scary as it suggests the heart has stopped working. But it actually means the heart is not doing its job as well as it should. Heart failure happens when your heart muscle cannot keep up with your body's need for blood flow. Symptoms of heart failure can be controlled by changes in your lifestyle and by following your doctor's advice.   Home Care  Activity   Ask your doctor about an exercise program. You can benefit from simple activities such as walking or gardening. Exercising most days of the week can make you feel better. Don't be discouraged if your progress is slow at first. Rest as needed and stop activity if you develop symptoms such as chest pain, lightheadedness, or significant shortness of breath. Your doctor may prescribe a cardiac rehabilitation program. This is a program to help recover from heart disease through professional lifestyle counseling and education and medically supervised physical activity.   Diet   Follow a heart healthy diet and work hard to remove salt from your diet. Try to limit total salt intake to 2000 mg a day or less. Salt causes your body to retain water, which can make it harder for your heart to pump. You can limit salt by doing the following:  Limit canned, dried, packaged, and fast foods.  Don't add salt to your food at the table.  Season foods with herbs instead of salt when you cook.  Monitor your fluid intake. Drinking too much fluid can make heart failure worse. It is commonly advised to limit total fluid intake to less than 66 ounces (2 liters) a day.  Limit alcohol. Too much alcohol can be harmful to the heart. Alcohol should be limited to no more than one serving a day for women and two servings a day for men.  Tobacco   Break the smoking habit. Smoking increases your chance of having a heart attack, which will worsen heart failure. Quitting smoking is " the number one thing you can do to improve your health. Enroll in a stop smoking program and ask your doctor about medications or nicotine replacement therapy. These methods improve your chances of success.  Medications   Take your medications exactly as prescribed. Learn the names and purpose of each of your medications. Keep an accurate medication list with you at all times including current dosages. Don't skip doses. If you miss a dose of your medication, take it as soon as you remember, unless it's almost time for your next dose. In that case, just wait and take your next dose at the normal time. Don't take a double dose. If you are unsure, contact your doctor or pharmacist.  Weight monitoring   Weigh yourself every day. Do this at the same time of day and in the same kind of clothes. Ideally, weigh yourself first thing every morning after you empty your bladder, but before you eat breakfast. Record your weight and take a record of it to each of your doctor's visit. If your weight increases by 3 pounds in one day or 5 pounds in one week, you should contact your doctor. This is a sign that you are retaining more fluid than you should be, which can worsen heart failure.  Symptoms   Heart failure can cause a variety of symptoms including the following:  Shortness of breath  Difficulty breathing at night  Swelling in the legs and feet or in the abdomen  Becoming easily fatigued  Irregular or rapid heartbeat  Weakness or lightheadedness  It is important to know what to do if you develop signs of worsening heart failure.  Follow-Up  Make a follow-up appointment as directed by our staff. They will provide specific instruction for timing of appointments. Depending on the type and severity of heart failure you have, you may require follow up as early as 7 days from hospital discharge. Keep appointments for checkups and lab tests that are needed to check your medications and condition.  .    When to Call Your Doctor  Call  your doctor right away if you have any of the following signs of worsening heart failure:  Sudden weight gain (3 or more pounds in one day or 5 or more pounds in one week)  Trouble breathing not related to being active  New or increased swelling of your legs or ankles  Swelling or pain in your abdomen  Breathing trouble at night (waking up short of breath, needing more pillows to breathe)  Frequent coughing that doesnt go away  Feeling much more tired than usual  When to Seek Emergency Medical Attention   Call 911 right away if you develop:  Severe shortness of breath, such that you cannot catch your breath, even resting  Severe chest pain that does not resolve with rest or nitroglycerin  Pink, foamy mucus with cough and shortness of breath  A continuous rapid or irregular heartbeat  Passing out or fainting  Acute stroke symptoms such as sudden numbness or weakness on one side of your face, arm, or leg, or sudden confusion, trouble speaking or vision changes.   © 4047-8307 [employer], [address] [place] [address], [place] [address]. All rights reserved. This information is not intended as a substitute for professional medical care. Always follow your healthcare professional's instructions.

## 2018-11-02 NOTE — TELEPHONE ENCOUNTER
Patient called to report the following:     -does not need HH  -states that I was declined for HH, MD already notified   -patient has no further concerns at this time     Reason for Disposition   [1] Other NON-URGENT information for PCP AND [2] does not require PCP response    Protocols used: ST PCP CALL - NO TRIAGE-A-

## 2018-11-03 ENCOUNTER — TELEPHONE (OUTPATIENT)
Dept: FAMILY MEDICINE | Facility: CLINIC | Age: 48
End: 2018-11-03

## 2018-11-05 ENCOUNTER — PATIENT OUTREACH (OUTPATIENT)
Dept: ADMINISTRATIVE | Facility: HOSPITAL | Age: 48
End: 2018-11-05

## 2018-11-05 PROBLEM — Z09 HOSPITAL DISCHARGE FOLLOW-UP: Status: ACTIVE | Noted: 2018-11-05

## 2018-11-05 PROBLEM — I42.8 NON-ISCHEMIC CARDIOMYOPATHY: Status: ACTIVE | Noted: 2018-11-05

## 2018-11-08 ENCOUNTER — TELEPHONE (OUTPATIENT)
Dept: FAMILY MEDICINE | Facility: CLINIC | Age: 48
End: 2018-11-08

## 2018-11-08 NOTE — TELEPHONE ENCOUNTER
----- Message from July Aldridge sent at 11/8/2018 10:25 AM CST -----  Contact: jennyfer with doctor nolan [phone]            Name of Who is Calling: jennyfer      What is the request in detail: needs life vest ordered. Please call jennyfer      Can the clinic reply by [facility]: no      What Number to Call Back [facility]: jennyfer with doctor nolan [phone]

## 2018-11-12 ENCOUNTER — LAB VISIT (OUTPATIENT)
Dept: LAB | Facility: HOSPITAL | Age: 48
End: 2018-11-12
Attending: INTERNAL MEDICINE
Payer: COMMERCIAL

## 2018-11-12 DIAGNOSIS — E05.00 GRAVES DISEASE: ICD-10-CM

## 2018-11-12 LAB
T4 FREE SERPL-MCNC: 1.9 NG/DL
TSH SERPL DL<=0.005 MIU/L-ACNC: 0.13 UIU/ML

## 2018-11-12 PROCEDURE — 84480 ASSAY TRIIODOTHYRONINE (T3): CPT

## 2018-11-12 PROCEDURE — 84443 ASSAY THYROID STIM HORMONE: CPT

## 2018-11-12 PROCEDURE — 36415 COLL VENOUS BLD VENIPUNCTURE: CPT

## 2018-11-12 PROCEDURE — 84439 ASSAY OF FREE THYROXINE: CPT

## 2018-11-13 LAB — T3 SERPL-MCNC: 330 NG/DL

## 2018-11-16 ENCOUNTER — OFFICE VISIT (OUTPATIENT)
Dept: CARDIOLOGY | Facility: CLINIC | Age: 48
End: 2018-11-16
Payer: COMMERCIAL

## 2018-11-16 VITALS
WEIGHT: 119 LBS | OXYGEN SATURATION: 100 % | DIASTOLIC BLOOD PRESSURE: 60 MMHG | BODY MASS INDEX: 22.47 KG/M2 | SYSTOLIC BLOOD PRESSURE: 132 MMHG | HEIGHT: 61 IN | RESPIRATION RATE: 15 BRPM | HEART RATE: 90 BPM

## 2018-11-16 DIAGNOSIS — I42.8 NICM (NONISCHEMIC CARDIOMYOPATHY): Primary | ICD-10-CM

## 2018-11-16 DIAGNOSIS — E03.8 OTHER SPECIFIED HYPOTHYROIDISM: ICD-10-CM

## 2018-11-16 DIAGNOSIS — I42.8 NON-ISCHEMIC CARDIOMYOPATHY: ICD-10-CM

## 2018-11-16 PROCEDURE — 3008F BODY MASS INDEX DOCD: CPT | Mod: CPTII,S$GLB,, | Performed by: INTERNAL MEDICINE

## 2018-11-16 PROCEDURE — 99999 PR PBB SHADOW E&M-EST. PATIENT-LVL III: CPT | Mod: PBBFAC,,, | Performed by: INTERNAL MEDICINE

## 2018-11-16 PROCEDURE — 99214 OFFICE O/P EST MOD 30 MIN: CPT | Mod: S$GLB,,, | Performed by: INTERNAL MEDICINE

## 2018-11-16 RX ORDER — CARVEDILOL 6.25 MG/1
6.25 TABLET ORAL 2 TIMES DAILY
Qty: 180 TABLET | Refills: 3 | Status: SHIPPED | OUTPATIENT
Start: 2018-11-16 | End: 2018-12-31 | Stop reason: SDUPTHER

## 2018-11-16 NOTE — PROGRESS NOTES
CARDIOVASCULAR PROGRESS NOTE    REASON FOR CONSULT:   Vipin Whitaker is a 48 y.o. female who presents for follow up of [facility].    Endo: JESSICA Chisholm  PCP: CHARLES Chisholm  HISTORY OF PRESENT ILLNESS:   The patient returns for follow-up of her recent hospitalization, accompanied by several family members.  She reports generally asymptomatic status without angina or dyspnea.  She is wearing her life vest, and has had no discharges.  She denies palpitations, lightheadedness, dizziness, or syncope.  There has been no PND, orthopnea, or lower extremity edema.  She denies melena, hematuria, or claudicant symptoms.  There were no complications related to her right femoral artery and venous access for her heart catheterization.    CARDIOVASCULAR HISTORY:   [facility] (cath 10/2018)    PAST MEDICAL HISTORY:     Past Medical History:   Diagnosis Date    Abnormal Pap smear     Acute combined systolic and diastolic congestive heart failure 10/30/2018    Depression     Hospital discharge follow-up 11/5/2018    Hypertension 10/27/2018    Normocytic anemia 10/28/2018    Thyroid disease        PAST SURGICAL HISTORY:     Past Surgical History:   Procedure Laterality Date    CATHETERIZATION OF BOTH LEFT AND RIGHT HEART Bilateral 10/30/2018    Procedure: CATHETERIZATION, HEART, BOTH LEFT AND RIGHT with sat run. RFA access;  Surgeon: Дмитрий Limon MD;  Location: [facility] CATH LAB;  Service: Cardiology;  Laterality: Bilateral;    CATHETERIZATION, HEART, BOTH LEFT AND RIGHT with sat run. RFA access Bilateral 10/30/2018    Performed by Дмитрий Limon MD at [facility] CATH LAB    COLONOSCOPY N/A 11/3/2017    Procedure: COLONOSCOPY;  Surgeon: Leighton Ross MD;  Location: [facility] ENDO;  Service: Endoscopy;  Laterality: N/A;    COLONOSCOPY N/A 11/3/2017    Performed by Leighton Ross MD at [facility] ENDO    SALPINGECTOMY         ALLERGIES AND MEDICATION:   Review of patient's allergies indicates:  No Known Allergies     Medication List            Accurate as of 11/16/18  2:17 PM. If you have any questions, ask your nurse or doctor.               CONTINUE taking these medications    carvedilol 3.125 MG tablet  Commonly known as:  COREG  Take 1 tablet (3.125 mg total) by mouth 2 (two) times daily.     cloNIDine 0.1 MG tablet  Commonly known as:  CATAPRES  Take 2 tablets (0.2 mg total) by mouth 2 (two) times daily.     furosemide 40 MG tablet  Commonly known as:  LASIX  Take 1 tablet (40 mg total) by mouth 2 (two) times daily.     losartan 50 MG tablet  Commonly known as:  COZAAR  Take 1 tablet (50 mg total) by mouth once daily.     methIMAzole 5 MG Tab  Commonly known as:  TAPAZOLE  Take 1 tablet (5 mg total) by mouth once daily.     oxyCODONE-acetaminophen 5-325 mg per tablet  Commonly known as:  PERCOCET  Take 1 tablet by mouth every 6 (six) hours as needed.     spironolactone 25 MG tablet  Commonly known as:  ALDACTONE  Take 1 tablet (25 mg total) by mouth once daily.            SOCIAL HISTORY:     Social History     Socioeconomic History    Marital status:      Spouse name: Not on file    Number of children: Not on file    Years of education: Not on file    Highest education level: Not on file   Social Needs    Financial resource strain: Not on file    Food insecurity - worry: Not on file    Food insecurity - inability: Not on file    Transportation needs - medical: Not on file    Transportation needs - non-medical: Not on file   Occupational History    Not on file   Tobacco Use    Smoking status: Never Smoker    Smokeless tobacco: Never Used   Substance and Sexual Activity    Alcohol use: No    Drug use: No    Sexual activity: Yes     Partners: Male     Birth control/protection: None   Other Topics Concern    Not on file   Social History Narrative    Not on file       FAMILY HISTORY:     Family History   Problem Relation Age of Onset    Colon cancer Mother     Hypertension Mother     No Known Problems Father     Cancer  "Sister     No Known Problems Brother     Cancer Maternal Aunt     Heart murmur Maternal Uncle        REVIEW OF SYSTEMS:   Review of Systems   Constitutional: Negative for chills, diaphoresis and fever.   HENT: Negative for nosebleeds.    Eyes: Negative for blurred vision, double vision and photophobia.   Respiratory: Negative for hemoptysis, shortness of breath and wheezing.    Cardiovascular: Negative for chest pain, palpitations, orthopnea, claudication, leg swelling and PND.   Gastrointestinal: Negative for abdominal pain, blood in stool, heartburn, melena, nausea and vomiting.   Genitourinary: Negative for flank pain and hematuria.   Musculoskeletal: Negative for falls, myalgias and neck pain.   Skin: Negative for rash.   Neurological: Negative for dizziness, seizures, loss of consciousness, weakness and headaches.   Endo/Heme/Allergies: Negative for polydipsia. Does not bruise/bleed easily.   Psychiatric/Behavioral: Negative for depression and memory loss. The patient is not nervous/anxious.        PHYSICAL EXAM:     Vitals:    11/16/18 1358   BP: 132/60   Pulse: 90   Resp: 15    Body mass index is 22.48 kg/m².  Weight: 54 kg (119 lb)   Height: 5' 1" (154.9 cm)     Physical Exam   Constitutional: She is oriented to person, place, and time. She appears well-developed and well-nourished. She is cooperative.  Non-toxic appearance. No distress.   HENT:   Head: Normocephalic and atraumatic.   Eyes: Conjunctivae and EOM are normal. Pupils are equal, round, and reactive to light. No scleral icterus.   Neck: Trachea normal and normal range of motion. Neck supple. Normal carotid pulses and no JVD present. Carotid bruit is not present. No neck rigidity. No tracheal deviation and no edema present. No thyromegaly present.   Cardiovascular: Normal rate, regular rhythm, S1 normal and S2 normal. PMI is not displaced. Exam reveals no gallop and no friction rub.   No murmur heard.  Pulses:       Carotid pulses are 2+ on the " right side, and 2+ on the left side.  Wearing LifeVest   Pulmonary/Chest: Effort normal and breath sounds normal. No stridor. No respiratory distress. She has no wheezes. She has no rales. She exhibits no tenderness.   Abdominal: Soft. She exhibits no distension. There is no hepatosplenomegaly.   Musculoskeletal: She exhibits no edema or tenderness.   Feet:   Right Foot:   Skin Integrity: Negative for ulcer.   Left Foot:   Skin Integrity: Negative for ulcer.   Neurological: She is alert and oriented to person, place, and time. No cranial nerve deficit.   Skin: Skin is warm and dry. No rash noted. No erythema.   Psychiatric: She has a normal mood and affect. Her speech is normal and behavior is normal.   Vitals reviewed.      DATA:   EKG: (personally reviewed tracing)  10/27/18 SR 76, PRWP, PVC, inflat ST abnl ?isch    Laboratory:  CBC:  Recent Labs   Lab 10/27/18  1405 10/30/18  0507 10/31/18  0729   WHITE BLOOD CELL COUNT 5.22 4.28 4.90   HEMOGLOBIN 11.5 L 11.6 L 12.6   HEMATOCRIT 35.0 L 34.8 L 39.2   PLATELETS 258 227 230       CHEMISTRIES:  Recent Labs   Lab 10/29/18  0641 10/30/18  0507 10/31/18  0729   GLUCOSE 89 109  107 86   SODIUM 139 140  140 140   POTASSIUM 3.3 L 3.7  3.7 3.2 L   BUN BLD 10 15  15 13   CREATININE 0.8 0.8  0.8 0.8   EGFR IF AFRICAN AMERICAN >60 >60  >60 >60   EGFR IF NON- >60 >60  >60 >60   CALCIUM 8.7 9.0  8.9 8.5 L       CARDIAC BIOMARKERS:  Recent Labs   Lab 10/27/18  2004 10/28/18  0130 10/28/18  1239   TROPONIN I 0.025 0.033 H 0.033 H       COAGS:  Recent Labs   Lab 10/27/18  1405   INR 1.5 H       LIPIDS/LFTS:  Recent Labs   Lab 05/05/17  0900  05/07/18  0711 10/15/18  1240 10/30/18  0507   CHOLESTEROL 117 L  --  150  --   --    TRIGLYCERIDES 57  --  86  --   --    HDL 48  --  55  --   --    LDL CHOLESTEROL 57.6 L  --  77.8  --   --    NON-HDL CHOLESTEROL 69  --  95  --   --    AST 20   < > 25 24 51 H   ALT 29   < > 32 18 73 H    < > = values in this interval  not displayed.     Lab Results   Component Value Date    TSH 0.127 (L) 11/12/2018     Free T4 1.90 (11/12/18)    Cardiovascular Testing:  Cath 10/30/18  Pressures:  RA 16  RV 68/19  PA 67/26/39  PAWP 23  /20  Ao 138/76/98  Thermal CO 4.91 L/min  Sats:  RA 52.5%  RV 55.8%  PA 55.3%  FA 94%  Naye CO 2.7L/min  LVEF: 15% by echo  Wall Motion: severe global HK  Dominance: Right  LM: normal  LAD: normal  LCx: normal  RCA: normal  Hemostasis:  RFA/V man compression  Impression:  Normal cors with severe LV dysfxn  Elevated LVEDP with mod-sev PHTN  Findings c/w NICM  Plan:  Cont Med rx:  ASA 81mg qd  IV lasix for goal net neg 1-2L/day  Cont coreg/ARB/aldact and titrate as HR/BP will allow  Lifevest ordered  Cont rx of hypothyroidism  Repeat echo 3 months (late Jan 2019) for reassessment of LVEF and need for ICD     Echo: 10/29/18  · Left ventricle ejection fraction is severely decreased at 15%  · Severe global hypokinetic wall motion.  · The left ventricle cavity is severely dilated.  · Left ventricle shows eccentric hypertrophy.  · Grade III (severe) left ventricular diastolic dysfunction consistent with restrictive physiology.  · Right ventricular cavity size is mildly dilated.  · RV systolic function is moderately reduced.  · Moderate mitral regurgitation.  · Severe tricuspid regurgitation.  · The estimated PA systolic pressure is 39.36 mm Hg  · Trivial pericardial effusion without hemodynamic compromise    LE venous US 10/27/18  No evidence of lower extremity deep venous thrombosis (bilat).    ASSESSMENT:   # NICM  # hyper->hypothyroidism, followed by Dr. Chisholm  # HTN, controlled    PLAN:   Cont med rx  Wean off clonidine  Inc coreg 6.25mg bid  Eventually titrate ARB  Continue with work with Dr. Chisholm for mgmt of thyroid disease  RTC 1 month  Repeat echo Late Jane 2019 for reassessment of LV fxn and need for ICD    Дмитрий Limon MD, FACC

## 2018-11-22 ENCOUNTER — TELEPHONE (OUTPATIENT)
Dept: ADMINISTRATIVE | Facility: CLINIC | Age: 48
End: 2018-11-22

## 2018-11-28 ENCOUNTER — TELEPHONE (OUTPATIENT)
Dept: CARDIOLOGY | Facility: CLINIC | Age: 48
End: 2018-11-28

## 2018-12-04 ENCOUNTER — TELEPHONE (OUTPATIENT)
Dept: CARDIOLOGY | Facility: CLINIC | Age: 48
End: 2018-12-04

## 2018-12-12 ENCOUNTER — LAB VISIT (OUTPATIENT)
Dept: LAB | Facility: HOSPITAL | Age: 48
End: 2018-12-12
Attending: INTERNAL MEDICINE
Payer: COMMERCIAL

## 2018-12-12 DIAGNOSIS — E05.00 GRAVES DISEASE: ICD-10-CM

## 2018-12-12 LAB
T4 FREE SERPL-MCNC: 2.22 NG/DL
T4 FREE SERPL-MCNC: 2.22 NG/DL
TSH SERPL DL<=0.005 MIU/L-ACNC: 0.01 UIU/ML

## 2018-12-12 PROCEDURE — 84480 ASSAY TRIIODOTHYRONINE (T3): CPT

## 2018-12-12 PROCEDURE — 36415 COLL VENOUS BLD VENIPUNCTURE: CPT

## 2018-12-12 PROCEDURE — 84439 ASSAY OF FREE THYROXINE: CPT

## 2018-12-12 PROCEDURE — 84443 ASSAY THYROID STIM HORMONE: CPT

## 2018-12-13 LAB — T3 SERPL-MCNC: 246 NG/DL

## 2019-01-28 ENCOUNTER — OFFICE VISIT (OUTPATIENT)
Dept: CARDIOLOGY | Facility: CLINIC | Age: 49
End: 2019-01-28
Payer: COMMERCIAL

## 2019-01-28 ENCOUNTER — HOSPITAL ENCOUNTER (OUTPATIENT)
Dept: CARDIOLOGY | Facility: HOSPITAL | Age: 49
Discharge: HOME | End: 2019-01-28
Attending: INTERNAL MEDICINE
Payer: COMMERCIAL

## 2019-01-28 VITALS
RESPIRATION RATE: 15 BRPM | WEIGHT: 127 LBS | OXYGEN SATURATION: 99 % | DIASTOLIC BLOOD PRESSURE: 64 MMHG | BODY MASS INDEX: 23.98 KG/M2 | HEIGHT: 61 IN | HEART RATE: 73 BPM | SYSTOLIC BLOOD PRESSURE: 122 MMHG

## 2019-01-28 VITALS — WEIGHT: 127 LBS | BODY MASS INDEX: 23.98 KG/M2 | HEIGHT: 61 IN

## 2019-01-28 DIAGNOSIS — E03.8 OTHER SPECIFIED HYPOTHYROIDISM: ICD-10-CM

## 2019-01-28 DIAGNOSIS — I42.8 NON-ISCHEMIC CARDIOMYOPATHY: ICD-10-CM

## 2019-01-28 DIAGNOSIS — I42.8 NICM (NONISCHEMIC CARDIOMYOPATHY): ICD-10-CM

## 2019-01-28 DIAGNOSIS — I42.8 NICM (NONISCHEMIC CARDIOMYOPATHY): Primary | ICD-10-CM

## 2019-01-28 DIAGNOSIS — I10 HYPERTENSION, UNSPECIFIED TYPE: ICD-10-CM

## 2019-01-28 LAB
AORTIC ROOT ANNULUS: 2.57 CM
AORTIC VALVE CUSP SEPERATION: 1.87 CM
ASCENDING AORTA: 2.42 CM
AV INDEX (PROSTH): 0.72
AV MEAN GRADIENT: 5.23 MMHG
AV PEAK GRADIENT: 8.88 MMHG
AV VALVE AREA: 2.16 CM2
AV VELOCITY RATIO: 0.82
BSA FOR ECHO PROCEDURE: 1.57 M2
CV ECHO LV RWT: 0.57 CM
DOP CALC AO PEAK VEL: 1.49 M/S
DOP CALC AO VTI: 30.15 CM
DOP CALC LVOT AREA: 3.02 CM2
DOP CALC LVOT DIAMETER: 1.96 CM
DOP CALC LVOT PEAK VEL: 1.21 M/S
DOP CALC LVOT STROKE VOLUME: 65.17 CM3
DOP CALCLVOT PEAK VEL VTI: 21.61 CM
E WAVE DECELERATION TIME: 239.03 MSEC
E/A RATIO: 1
E/E' RATIO: 10.57
ECHO LV POSTERIOR WALL: 1.31 CM (ref 0.6–1.1)
FRACTIONAL SHORTENING: 22 % (ref 28–44)
INTERVENTRICULAR SEPTUM: 1.31 CM (ref 0.6–1.1)
IVRT: 0.19 MSEC
LA MAJOR: 4.98 CM
LA MINOR: 4.1 CM
LA WIDTH: 2.99 CM
LEFT ATRIUM SIZE: 3.12 CM
LEFT ATRIUM VOLUME INDEX: 22.9 ML/M2
LEFT ATRIUM VOLUME: 35.66 CM3
LEFT INTERNAL DIMENSION IN SYSTOLE: 3.56 CM (ref 2.1–4)
LEFT VENTRICLE DIASTOLIC VOLUME INDEX: 61.58 ML/M2
LEFT VENTRICLE DIASTOLIC VOLUME: 95.91 ML
LEFT VENTRICLE MASS INDEX: 148 G/M2
LEFT VENTRICLE SYSTOLIC VOLUME INDEX: 34 ML/M2
LEFT VENTRICLE SYSTOLIC VOLUME: 52.96 ML
LEFT VENTRICULAR INTERNAL DIMENSION IN DIASTOLE: 4.57 CM (ref 3.5–6)
LEFT VENTRICULAR MASS: 230.44 G
LV LATERAL E/E' RATIO: 9.25
LV SEPTAL E/E' RATIO: 12.33
MV PEAK A VEL: 0.74 M/S
MV PEAK E VEL: 0.74 M/S
PISA TR MAX VEL: 2.14 M/S
PULM VEIN S/D RATIO: 1.33
PV PEAK D VEL: 0.46 M/S
PV PEAK S VEL: 0.61 M/S
PV PEAK VELOCITY: 1.23 CM/S
RA MAJOR: 4.66 CM
RA PRESSURE: 3 MMHG
RA WIDTH: 3.22 CM
RIGHT VENTRICULAR END-DIASTOLIC DIMENSION: 2.92 CM
RV TISSUE DOPPLER FREE WALL SYSTOLIC VELOCITY 1 (APICAL 4 CHAMBER VIEW): 7.91 M/S
SINUS: 2.76 CM
STJ: 1.97 CM
TDI LATERAL: 0.08
TDI SEPTAL: 0.06
TDI: 0.07
TR MAX PG: 18.32 MMHG
TRICUSPID ANNULAR PLANE SYSTOLIC EXCURSION: 2.07 CM
TV REST PULMONARY ARTERY PRESSURE: 21 MMHG

## 2019-01-28 PROCEDURE — 99999 PR PBB SHADOW E&M-EST. PATIENT-LVL III: CPT | Mod: PBBFAC,,, | Performed by: INTERNAL MEDICINE

## 2019-01-28 PROCEDURE — 93306 TTE W/DOPPLER COMPLETE: CPT

## 2019-01-28 PROCEDURE — 3078F DIAST BP <80 MM HG: CPT | Mod: CPTII,S$GLB,, | Performed by: INTERNAL MEDICINE

## 2019-01-28 PROCEDURE — 99999 PR PBB SHADOW E&M-EST. PATIENT-LVL III: ICD-10-PCS | Mod: PBBFAC,,, | Performed by: INTERNAL MEDICINE

## 2019-01-28 PROCEDURE — 3074F SYST BP LT 130 MM HG: CPT | Mod: CPTII,S$GLB,, | Performed by: INTERNAL MEDICINE

## 2019-01-28 PROCEDURE — 93306 TRANSTHORACIC ECHO (TTE) COMPLETE (CUPID ONLY): ICD-10-PCS | Mod: 26,,, | Performed by: INTERNAL MEDICINE

## 2019-01-28 PROCEDURE — 3008F PR BODY MASS INDEX (BMI) DOCUMENTED: ICD-10-PCS | Mod: CPTII,S$GLB,, | Performed by: INTERNAL MEDICINE

## 2019-01-28 PROCEDURE — 93306 TTE W/DOPPLER COMPLETE: CPT | Mod: 26,,, | Performed by: INTERNAL MEDICINE

## 2019-01-28 PROCEDURE — 3008F BODY MASS INDEX DOCD: CPT | Mod: CPTII,S$GLB,, | Performed by: INTERNAL MEDICINE

## 2019-01-28 PROCEDURE — 99214 OFFICE O/P EST MOD 30 MIN: CPT | Mod: S$GLB,,, | Performed by: INTERNAL MEDICINE

## 2019-01-28 PROCEDURE — 3078F PR MOST RECENT DIASTOLIC BLOOD PRESSURE < 80 MM HG: ICD-10-PCS | Mod: CPTII,S$GLB,, | Performed by: INTERNAL MEDICINE

## 2019-01-28 PROCEDURE — 3074F PR MOST RECENT SYSTOLIC BLOOD PRESSURE < 130 MM HG: ICD-10-PCS | Mod: CPTII,S$GLB,, | Performed by: INTERNAL MEDICINE

## 2019-01-28 PROCEDURE — 99214 PR OFFICE/OUTPT VISIT, EST, LEVL IV, 30-39 MIN: ICD-10-PCS | Mod: S$GLB,,, | Performed by: INTERNAL MEDICINE

## 2019-01-28 NOTE — PROGRESS NOTES
CARDIOVASCULAR PROGRESS NOTE    REASON FOR CONSULT:   Vipin Whitaker is a 48 y.o. female who presents for follow up of [facility].    Endo: JESSICA Chisholm  PCP: CHARLES Chisholm  HISTORY OF PRESENT ILLNESS:   The patient returns for follow-up.  She missed her previous follow-up appointments.  She reports generally asymptomatic status without angina or dyspnea.  There has been no palpitations, lightheadedness, dizziness, syncope, or LifeVest discharges.  She denies any PND, orthopnea, or lower extremity edema.  There has been no melena, hematuria, or claudicant symptoms.    CARDIOVASCULAR HISTORY:   [facility] (cath 10/2018)    PAST MEDICAL HISTORY:     Past Medical History:   Diagnosis Date    Abnormal Pap smear     Acute combined systolic and diastolic congestive heart failure 10/30/2018    Depression     Hospital discharge follow-up 11/5/2018    Hypertension 10/27/2018    Normocytic anemia 10/28/2018    Thyroid disease        PAST SURGICAL HISTORY:     Past Surgical History:   Procedure Laterality Date    CATHETERIZATION, HEART, BOTH LEFT AND RIGHT with sat run. RFA access Bilateral 10/30/2018    Performed by Дмитрий Limon MD at [facility] CATH LAB    COLONOSCOPY N/A 11/3/2017    Performed by Leighton Ross MD at [facility] ENDO    SALPINGECTOMY         ALLERGIES AND MEDICATION:   Review of patient's allergies indicates:  No Known Allergies     Medication List           Accurate as of 1/28/19 11:15 AM. If you have any questions, ask your nurse or doctor.               CONTINUE taking these medications    carvedilol 6.25 MG tablet  Commonly known as:  COREG  Take 1 tablet (6.25 mg total) by mouth 2 (two) times daily.     furosemide 40 MG tablet  Commonly known as:  LASIX  Take 1 tablet (40 mg total) by mouth 2 (two) times daily.     losartan 50 MG tablet  Commonly known as:  COZAAR  Take 1 tablet (50 mg total) by mouth once daily.     methIMAzole 10 MG Tab  Commonly known as:  TAPAZOLE  Take 1 tablet (10 mg total) by mouth  once daily.     oxyCODONE-acetaminophen 5-325 mg per tablet  Commonly known as:  PERCOCET  Take 1 tablet by mouth every 6 (six) hours as needed.     spironolactone 25 MG tablet  Commonly known as:  ALDACTONE  Take 1 tablet (25 mg total) by mouth once daily.            SOCIAL HISTORY:     Social History     Socioeconomic History    Marital status:      Spouse name: Not on file    Number of children: Not on file    Years of education: Not on file    Highest education level: Not on file   Social Needs    Financial resource strain: Not on file    Food insecurity - worry: Not on file    Food insecurity - inability: Not on file    Transportation needs - medical: Not on file    Transportation needs - non-medical: Not on file   Occupational History    Not on file   Tobacco Use    Smoking status: Never Smoker    Smokeless tobacco: Never Used   Substance and Sexual Activity    Alcohol use: No    Drug use: No    Sexual activity: Yes     Partners: Male     Birth control/protection: None   Other Topics Concern    Not on file   Social History Narrative    Not on file       FAMILY HISTORY:     Family History   Problem Relation Age of Onset    Colon cancer Mother     Hypertension Mother     No Known Problems Father     Cancer Sister     No Known Problems Brother     Cancer Maternal Aunt     Heart murmur Maternal Uncle        REVIEW OF SYSTEMS:   Review of Systems   Constitutional: Negative for chills, diaphoresis and fever.   HENT: Negative for nosebleeds.    Eyes: Negative for blurred vision, double vision and photophobia.   Respiratory: Negative for hemoptysis, shortness of breath and wheezing.    Cardiovascular: Negative for chest pain, palpitations, orthopnea, claudication, leg swelling and PND.   Gastrointestinal: Negative for abdominal pain, blood in stool, heartburn, melena, nausea and vomiting.   Genitourinary: Negative for flank pain and hematuria.   Musculoskeletal: Negative for falls,  "myalgias and neck pain.   Skin: Negative for rash.   Neurological: Negative for dizziness, seizures, loss of consciousness, weakness and headaches.   Endo/Heme/Allergies: Negative for polydipsia. Does not bruise/bleed easily.   Psychiatric/Behavioral: Negative for depression and memory loss. The patient is not nervous/anxious.        PHYSICAL EXAM:     Vitals:    01/28/19 1111   BP: 122/64   Pulse: 73   Resp: 15    Body mass index is 24 kg/m².  Weight: 57.6 kg (127 lb)   Height: 5' 1" (154.9 cm)     Physical Exam   Constitutional: She is oriented to person, place, and time. She appears well-developed and well-nourished. She is cooperative.  Non-toxic appearance. No distress.   HENT:   Head: Normocephalic and atraumatic.   Eyes: Conjunctivae and EOM are normal. Pupils are equal, round, and reactive to light. No scleral icterus.   Neck: Trachea normal and normal range of motion. Neck supple. Normal carotid pulses and no JVD present. Carotid bruit is not present. No neck rigidity. No tracheal deviation and no edema present. No thyromegaly present.   Cardiovascular: Normal rate, regular rhythm, S1 normal and S2 normal. PMI is not displaced. Exam reveals no gallop and no friction rub.   No murmur heard.  Pulses:       Carotid pulses are 2+ on the right side, and 2+ on the left side.  Wearing LifeVest   Pulmonary/Chest: Effort normal and breath sounds normal. No stridor. No respiratory distress. She has no wheezes. She has no rales. She exhibits no tenderness.   Abdominal: Soft. She exhibits no distension. There is no hepatosplenomegaly.   Musculoskeletal: She exhibits no edema or tenderness.   Feet:   Right Foot:   Skin Integrity: Negative for ulcer.   Left Foot:   Skin Integrity: Negative for ulcer.   Neurological: She is alert and oriented to person, place, and time. No cranial nerve deficit.   Skin: Skin is warm and dry. No rash noted. No erythema.   Psychiatric: She has a normal mood and affect. Her speech is normal " and behavior is normal.   Vitals reviewed.      DATA:   EKG: (personally reviewed tracing)  10/27/18 SR 76, PRWP, PVC, inflat ST abnl ?isch    Laboratory:  CBC:  Recent Labs   Lab 10/27/18  1405 10/30/18  0507 10/31/18  0729   WHITE BLOOD CELL COUNT 5.22 4.28 4.90   HEMOGLOBIN 11.5 L 11.6 L 12.6   HEMATOCRIT 35.0 L 34.8 L 39.2   PLATELETS 258 227 230       CHEMISTRIES:  Recent Labs   Lab 10/29/18  0641 10/30/18  0507 10/31/18  0729   GLUCOSE 89 109  107 86   SODIUM 139 140  140 140   POTASSIUM 3.3 L 3.7  3.7 3.2 L   BUN BLD 10 15  15 13   CREATININE 0.8 0.8  0.8 0.8   EGFR IF AFRICAN AMERICAN >60 >60  >60 >60   EGFR IF NON- >60 >60  >60 >60   CALCIUM 8.7 9.0  8.9 8.5 L       CARDIAC BIOMARKERS:  Recent Labs   Lab 10/27/18  2004 10/28/18  0130 10/28/18  1239   TROPONIN I 0.025 0.033 H 0.033 H       COAGS:  Recent Labs   Lab 10/27/18  1405   INR 1.5 H       LIPIDS/LFTS:  Recent Labs   Lab 05/05/17  0900  05/07/18  0711 10/15/18  1240 10/30/18  0507   CHOLESTEROL 117 L  --  150  --   --    TRIGLYCERIDES 57  --  86  --   --    HDL 48  --  55  --   --    LDL CHOLESTEROL 57.6 L  --  77.8  --   --    NON-HDL CHOLESTEROL 69  --  95  --   --    AST 20   < > 25 24 51 H   ALT 29   < > 32 18 73 H    < > = values in this interval not displayed.     Lab Results   Component Value Date    TSH 0.012 (L) 12/12/2018     Free T4 1.90 (11/12/18)    Cardiovascular Testing:  Cath 10/30/18  Pressures:  RA 16  RV 68/19  PA 67/26/39  PAWP 23  /20  Ao 138/76/98  Thermal CO 4.91 L/min  Sats:  RA 52.5%  RV 55.8%  PA 55.3%  FA 94%  Naye CO 2.7L/min  LVEF: 15% by echo  Wall Motion: severe global HK  Dominance: Right  LM: normal  LAD: normal  LCx: normal  RCA: normal  Hemostasis:  RFA/V man compression  Impression:  Normal cors with severe LV dysfxn  Elevated LVEDP with mod-sev PHTN  Findings c/w NICM  Plan:  Cont Med rx:  ASA 81mg qd  IV lasix for goal net neg 1-2L/day  Cont coreg/ARB/aldact and titrate as HR/BP will  allow  Lifevest ordered  Cont rx of hypothyroidism  Repeat echo 3 months (late Jan 2019) for reassessment of LVEF and need for ICD     Echo: 10/29/18  · Left ventricle ejection fraction is severely decreased at 15%  · Severe global hypokinetic wall motion.  · The left ventricle cavity is severely dilated.  · Left ventricle shows eccentric hypertrophy.  · Grade III (severe) left ventricular diastolic dysfunction consistent with restrictive physiology.  · Right ventricular cavity size is mildly dilated.  · RV systolic function is moderately reduced.  · Moderate mitral regurgitation.  · Severe tricuspid regurgitation.  · The estimated PA systolic pressure is 39.36 mm Hg  · Trivial pericardial effusion without hemodynamic compromise    LE venous US 10/27/18  No evidence of lower extremity deep venous thrombosis (bilat).    ASSESSMENT:   # NICM, appears euvolemic.  No lifevest discharges.  # hyper->hypothyroidism, followed by Dr. Chisholm  # HTN, controlled    PLAN:   Cont med rx  Check echo for reassessment of LVEF  Continue with work with Dr. Chisholm for mgmt of thyroid disease  RTC 1 week    Дмитрий Limon MD, FACC

## 2019-02-04 PROBLEM — Z09 HOSPITAL DISCHARGE FOLLOW-UP: Status: RESOLVED | Noted: 2018-11-05 | Resolved: 2019-02-04

## 2019-03-10 DIAGNOSIS — I42.8 NON-ISCHEMIC CARDIOMYOPATHY: ICD-10-CM

## 2019-03-10 RX ORDER — FUROSEMIDE 40 MG/1
TABLET ORAL
Qty: 60 TABLET | Refills: 3 | Status: SHIPPED | OUTPATIENT
Start: 2019-03-10

## 2019-09-17 DIAGNOSIS — E05.00 GRAVES DISEASE: Primary | ICD-10-CM

## 2019-10-01 ENCOUNTER — HOSPITAL ENCOUNTER (OUTPATIENT)
Dept: RADIOLOGY | Facility: HOSPITAL | Age: 49
Discharge: HOME | End: 2019-10-01
Attending: INTERNAL MEDICINE
Payer: COMMERCIAL

## 2019-10-01 DIAGNOSIS — E05.00 GRAVES DISEASE: ICD-10-CM

## 2019-10-01 PROCEDURE — 79005 NM THERAPY BY ORAL ADMINISTRATION: ICD-10-PCS | Mod: 26,,, | Performed by: RADIOLOGY

## 2019-10-01 PROCEDURE — 79005 NUCLEAR RX ORAL ADMIN: CPT | Mod: TC

## 2019-10-01 PROCEDURE — 79005 NUCLEAR RX ORAL ADMIN: CPT | Mod: 26,,, | Performed by: RADIOLOGY

## 2019-12-24 NOTE — NURSING
Pt to d/c to home. Iv d/c . Tele d/c   Per case mgmt ok to d/c to home, life vest can be delivered to home. Spoke with Dr. Chisholm. Placed orders and ok to send pt home.    fall

## 2020-06-12 ENCOUNTER — HOSPITAL ENCOUNTER (EMERGENCY)
Facility: HOSPITAL | Age: 50
Discharge: HOME | End: 2020-06-13
Attending: EMERGENCY MEDICINE
Payer: COMMERCIAL
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
## 2020-06-12 DIAGNOSIS — S02.2XXA CLOSED FRACTURE OF NASAL BONE, INITIAL ENCOUNTER: ICD-10-CM

## 2020-06-12 DIAGNOSIS — M54.50 ACUTE MIDLINE LOW BACK PAIN WITHOUT SCIATICA: ICD-10-CM

## 2020-06-12 PROCEDURE — 99285 EMERGENCY DEPT VISIT HI MDM: CPT

## 2020-06-13 VITALS
TEMPERATURE: 98 F | WEIGHT: 132 LBS | DIASTOLIC BLOOD PRESSURE: 72 MMHG | HEIGHT: 62 IN | HEART RATE: 57 BPM | BODY MASS INDEX: 24.29 KG/M2 | OXYGEN SATURATION: 99 % | SYSTOLIC BLOOD PRESSURE: 164 MMHG | RESPIRATION RATE: 16 BRPM

## 2020-06-13 LAB
ALBUMIN SERPL BCP-MCNC: 4.6 G/DL (ref 3.5–5.2)
ALP SERPL-CCNC: 62 U/L (ref 55–135)
ALT SERPL W/O P-5'-P-CCNC: 10 U/L (ref 10–44)
ANION GAP SERPL CALC-SCNC: 9 MMOL/L (ref 8–16)
AST SERPL-CCNC: 17 U/L (ref 10–40)
B-HCG UR QL: NEGATIVE
BASOPHILS # BLD AUTO: 0.01 K/UL (ref 0–0.2)
BASOPHILS NFR BLD: 0.2 % (ref 0–1.9)
BILIRUB SERPL-MCNC: 0.8 MG/DL (ref 0.1–1)
BILIRUB UR QL STRIP: NEGATIVE
BUN SERPL-MCNC: 13 MG/DL (ref 6–20)
CALCIUM SERPL-MCNC: 9.4 MG/DL (ref 8.7–10.5)
CHLORIDE SERPL-SCNC: 105 MMOL/L (ref 95–110)
CLARITY UR: CLEAR
CO2 SERPL-SCNC: 25 MMOL/L (ref 23–29)
COLOR UR: YELLOW
CREAT SERPL-MCNC: 0.9 MG/DL (ref 0.5–1.4)
CTP QC/QA: YES
DIFFERENTIAL METHOD: ABNORMAL
EOSINOPHIL # BLD AUTO: 0.1 K/UL (ref 0–0.5)
EOSINOPHIL NFR BLD: 0.9 % (ref 0–8)
ERYTHROCYTE [DISTWIDTH] IN BLOOD BY AUTOMATED COUNT: 12 % (ref 11.5–14.5)
EST. GFR  (AFRICAN AMERICAN): >60 ML/MIN/1.73 M^2
EST. GFR  (NON AFRICAN AMERICAN): >60 ML/MIN/1.73 M^2
GLUCOSE SERPL-MCNC: 98 MG/DL (ref 70–110)
GLUCOSE UR QL STRIP: NEGATIVE
HCT VFR BLD AUTO: 32.2 % (ref 37–48.5)
HGB BLD-MCNC: 10.7 G/DL (ref 12–16)
HGB UR QL STRIP: NEGATIVE
IMM GRANULOCYTES # BLD AUTO: 0.01 K/UL (ref 0–0.04)
IMM GRANULOCYTES NFR BLD AUTO: 0.2 % (ref 0–0.5)
KETONES UR QL STRIP: ABNORMAL
LEUKOCYTE ESTERASE UR QL STRIP: ABNORMAL
LYMPHOCYTES # BLD AUTO: 1 K/UL (ref 1–4.8)
LYMPHOCYTES NFR BLD: 19.7 % (ref 18–48)
MCH RBC QN AUTO: 31.8 PG (ref 27–31)
MCHC RBC AUTO-ENTMCNC: 33.2 G/DL (ref 32–36)
MCV RBC AUTO: 96 FL (ref 82–98)
MICROSCOPIC COMMENT: NORMAL
MONOCYTES # BLD AUTO: 0.5 K/UL (ref 0.3–1)
MONOCYTES NFR BLD: 10.1 % (ref 4–15)
NEUTROPHILS # BLD AUTO: 3.6 K/UL (ref 1.8–7.7)
NEUTROPHILS NFR BLD: 68.9 % (ref 38–73)
NITRITE UR QL STRIP: NEGATIVE
NRBC BLD-RTO: 0 /100 WBC
PH UR STRIP: 5 [PH] (ref 5–8)
PLATELET # BLD AUTO: 259 K/UL (ref 150–350)
PMV BLD AUTO: 11.7 FL (ref 9.2–12.9)
POTASSIUM SERPL-SCNC: 4.4 MMOL/L (ref 3.5–5.1)
PROT SERPL-MCNC: 7.9 G/DL (ref 6–8.4)
PROT UR QL STRIP: NEGATIVE
RBC # BLD AUTO: 3.36 M/UL (ref 4–5.4)
SODIUM SERPL-SCNC: 139 MMOL/L (ref 136–145)
SP GR UR STRIP: 1.02 (ref 1–1.03)
SQUAMOUS #/AREA URNS HPF: 7 /HPF
TROPONIN I SERPL DL<=0.01 NG/ML-MCNC: <0.006 NG/ML (ref 0–0.03)
URN SPEC COLLECT METH UR: ABNORMAL
UROBILINOGEN UR STRIP-ACNC: ABNORMAL EU/DL
WBC # BLD AUTO: 5.27 K/UL (ref 3.9–12.7)
WBC #/AREA URNS HPF: 2 /HPF (ref 0–5)

## 2020-06-13 PROCEDURE — 81025 URINE PREGNANCY TEST: CPT | Performed by: EMERGENCY MEDICINE

## 2020-06-13 PROCEDURE — 85025 COMPLETE CBC W/AUTO DIFF WBC: CPT

## 2020-06-13 PROCEDURE — 81000 URINALYSIS NONAUTO W/SCOPE: CPT

## 2020-06-13 PROCEDURE — 80053 COMPREHEN METABOLIC PANEL: CPT

## 2020-06-13 PROCEDURE — 84484 ASSAY OF TROPONIN QUANT: CPT

## 2020-06-13 PROCEDURE — 25500020 PHARM REV CODE 255: Performed by: EMERGENCY MEDICINE

## 2020-06-13 RX ORDER — OXYCODONE AND ACETAMINOPHEN 5; 325 MG/1; MG/1
1 TABLET ORAL EVERY 4 HOURS PRN
Qty: 8 TABLET | Refills: 0 | Status: SHIPPED | OUTPATIENT
Start: 2020-06-13

## 2020-06-13 RX ORDER — IBUPROFEN 600 MG/1
600 TABLET ORAL EVERY 6 HOURS PRN
Qty: 20 TABLET | Refills: 0 | Status: SHIPPED | OUTPATIENT
Start: 2020-06-13

## 2020-06-13 RX ADMIN — IOHEXOL 75 ML: 350 INJECTION, SOLUTION INTRAVENOUS at 04:06
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
## 2020-06-13 NOTE — ED NOTES
Pt resting w/ eyes closed and arouses easily. No respiratory distress observed. Will continue to monitor closely.

## 2023-03-17 ENCOUNTER — OFFICE VISIT (OUTPATIENT)
Dept: CARDIOLOGY | Facility: CLINIC | Age: 53
End: 2023-03-17
Payer: COMMERCIAL

## 2023-03-17 VITALS
BODY MASS INDEX: 19.96 KG/M2 | SYSTOLIC BLOOD PRESSURE: 118 MMHG | RESPIRATION RATE: 18 BRPM | WEIGHT: 108.44 LBS | OXYGEN SATURATION: 100 % | HEIGHT: 62 IN | DIASTOLIC BLOOD PRESSURE: 62 MMHG | HEART RATE: 59 BPM

## 2023-03-17 DIAGNOSIS — R00.1 BRADYCARDIA: ICD-10-CM

## 2023-03-17 DIAGNOSIS — I10 ESSENTIAL HYPERTENSION: ICD-10-CM

## 2023-03-17 DIAGNOSIS — I42.8 NONISCHEMIC CARDIOMYOPATHY: ICD-10-CM

## 2023-03-17 DIAGNOSIS — E03.9 HYPOTHYROIDISM, UNSPECIFIED TYPE: ICD-10-CM

## 2023-03-17 DIAGNOSIS — S06.5XAA SDH (SUBDURAL HEMATOMA): ICD-10-CM

## 2023-03-17 DIAGNOSIS — R94.31 ABNORMAL EKG: ICD-10-CM

## 2023-03-17 DIAGNOSIS — R41.3 MEMORY DEFICIT: Primary | ICD-10-CM

## 2023-03-17 PROCEDURE — 99999 PR PBB SHADOW E&M-EST. PATIENT-LVL IV: ICD-10-PCS | Mod: PBBFAC,,, | Performed by: INTERNAL MEDICINE

## 2023-03-17 PROCEDURE — 3074F SYST BP LT 130 MM HG: CPT | Mod: CPTII,S$GLB,, | Performed by: INTERNAL MEDICINE

## 2023-03-17 PROCEDURE — 3078F PR MOST RECENT DIASTOLIC BLOOD PRESSURE < 80 MM HG: ICD-10-PCS | Mod: CPTII,S$GLB,, | Performed by: INTERNAL MEDICINE

## 2023-03-17 PROCEDURE — 99999 PR PBB SHADOW E&M-EST. PATIENT-LVL IV: CPT | Mod: PBBFAC,,, | Performed by: INTERNAL MEDICINE

## 2023-03-17 PROCEDURE — 3074F PR MOST RECENT SYSTOLIC BLOOD PRESSURE < 130 MM HG: ICD-10-PCS | Mod: CPTII,S$GLB,, | Performed by: INTERNAL MEDICINE

## 2023-03-17 PROCEDURE — 4010F PR ACE/ARB THEARPY RXD/TAKEN: ICD-10-PCS | Mod: CPTII,S$GLB,, | Performed by: INTERNAL MEDICINE

## 2023-03-17 PROCEDURE — 3008F PR BODY MASS INDEX (BMI) DOCUMENTED: ICD-10-PCS | Mod: CPTII,S$GLB,, | Performed by: INTERNAL MEDICINE

## 2023-03-17 PROCEDURE — 1159F MED LIST DOCD IN RCRD: CPT | Mod: CPTII,S$GLB,, | Performed by: INTERNAL MEDICINE

## 2023-03-17 PROCEDURE — 93000 ELECTROCARDIOGRAM COMPLETE: CPT | Mod: S$GLB,,, | Performed by: INTERNAL MEDICINE

## 2023-03-17 PROCEDURE — 4010F ACE/ARB THERAPY RXD/TAKEN: CPT | Mod: CPTII,S$GLB,, | Performed by: INTERNAL MEDICINE

## 2023-03-17 PROCEDURE — 1159F PR MEDICATION LIST DOCUMENTED IN MEDICAL RECORD: ICD-10-PCS | Mod: CPTII,S$GLB,, | Performed by: INTERNAL MEDICINE

## 2023-03-17 PROCEDURE — 3008F BODY MASS INDEX DOCD: CPT | Mod: CPTII,S$GLB,, | Performed by: INTERNAL MEDICINE

## 2023-03-17 PROCEDURE — 1160F RVW MEDS BY RX/DR IN RCRD: CPT | Mod: CPTII,S$GLB,, | Performed by: INTERNAL MEDICINE

## 2023-03-17 PROCEDURE — 3078F DIAST BP <80 MM HG: CPT | Mod: CPTII,S$GLB,, | Performed by: INTERNAL MEDICINE

## 2023-03-17 PROCEDURE — 1160F PR REVIEW ALL MEDS BY PRESCRIBER/CLIN PHARMACIST DOCUMENTED: ICD-10-PCS | Mod: CPTII,S$GLB,, | Performed by: INTERNAL MEDICINE

## 2023-03-17 PROCEDURE — 99205 OFFICE O/P NEW HI 60 MIN: CPT | Mod: S$GLB,,, | Performed by: INTERNAL MEDICINE

## 2023-03-17 PROCEDURE — 99205 PR OFFICE/OUTPT VISIT, NEW, LEVL V, 60-74 MIN: ICD-10-PCS | Mod: S$GLB,,, | Performed by: INTERNAL MEDICINE

## 2023-03-17 PROCEDURE — 93000 EKG 12-LEAD: ICD-10-PCS | Mod: S$GLB,,, | Performed by: INTERNAL MEDICINE

## 2023-03-17 NOTE — PROGRESS NOTES
CARDIOVASCULAR CONSULTATION    REASON FOR CONSULT:   Vipin Whitaker is a 52 y.o. female who presents for NICM, memory deficit.    PCP: Kathrine Burns/Neuro: Leonel  HISTORY OF PRESENT ILLNESS:   Last seen January 2019    The patient returns for follow-up after greater than 3 year hiatus.  She is accompanied by her  and son.  She is sent in today by her neurologist for further investigation of progressive short-term memory deficits.  She underwent brain MRI which notes a small subdural hematoma.  I have suggested they follow-up with neurology for further evaluation.  She otherwise denies angina, dyspnea, palpitations, or syncope.  There has been no PND, orthopnea, or lower extremity edema.  She denies melena, hematuria, or claudicant symptoms.      Family history appears to be negative premature CAD amongst first-degree relatives.      The patient is a nonsmoker.  She denies alcohol excess or illicit drug use.  She works in [employer].    CARDIOVASCULAR HISTORY:   NIC (cath 10/2018)    PAST MEDICAL HISTORY:     Past Medical History:   Diagnosis Date    Abnormal Pap smear     Acute combined systolic and diastolic congestive heart failure 10/30/2018    Depression     Hospital discharge follow-up 11/5/2018    Hypertension 10/27/2018    Normocytic anemia 10/28/2018    Thyroid disease        PAST SURGICAL HISTORY:     Past Surgical History:   Procedure Laterality Date    CATHETERIZATION OF BOTH LEFT AND RIGHT HEART Bilateral 10/30/2018    Procedure: CATHETERIZATION, HEART, BOTH LEFT AND RIGHT with sat run. RFA access;  Surgeon: Дмитрий Limon MD;  Location: [facility] CATH LAB;  Service: Cardiology;  Laterality: Bilateral;    COLONOSCOPY N/A 11/3/2017    Procedure: COLONOSCOPY;  Surgeon: Leighton Ross MD;  Location: [facility] ENDO;  Service: Endoscopy;  Laterality: N/A;    SALPINGECTOMY         ALLERGIES AND MEDICATION:   Review of patient's allergies indicates:  No Known Allergies     Medication List             Accurate as of March 17, 2023 10:17 AM. If you have any questions, ask your nurse or doctor.                CONTINUE taking these medications      carvediloL 6.25 MG tablet  Commonly known as: COREG  Take 1 tablet (6.25 mg total) by mouth 2 (two) times daily.     furosemide 40 MG tablet  Commonly known as: LASIX  TAKE 1 TABLET BY MOUTH TWICE A DAY     ibuprofen 600 MG tablet  Commonly known as: ADVIL,MOTRIN  Take 1 tablet (600 mg total) by mouth every 6 (six) hours as needed.     losartan 50 MG tablet  Commonly known as: COZAAR  Take 1 tablet (50 mg total) by mouth once daily.     methIMAzole 10 MG Tab  Commonly known as: TAPAZOLE  Take 1 tablet (10 mg total) by mouth once daily.     * oxyCODONE-acetaminophen 5-325 mg per tablet  Commonly known as: PERCOCET  Take 1 tablet by mouth every 6 (six) hours as needed.     * oxyCODONE-acetaminophen 5-325 mg per tablet  Commonly known as: PERCOCET  Take 1 tablet by mouth every 4 (four) hours as needed for Pain.     spironolactone 25 MG tablet  Commonly known as: ALDACTONE  Take 1 tablet (25 mg total) by mouth once daily.           * This list has 2 medication(s) that are the same as other medications prescribed for you. Read the directions carefully, and ask your doctor or other care provider to review them with you.                  SOCIAL HISTORY:     Social History     Socioeconomic History    Marital status:    Tobacco Use    Smoking status: Never    Smokeless tobacco: Never   Substance and Sexual Activity    Alcohol use: No    Drug use: No    Sexual activity: Yes     Partners: Male     Birth control/protection: None       FAMILY HISTORY:     Family History   Problem Relation Age of Onset    Colon cancer Mother     Hypertension Mother     No Known Problems Father     Cancer Sister     No Known Problems Brother     Cancer Maternal Aunt     Heart murmur Maternal Uncle        REVIEW OF SYSTEMS:   Review of Systems   Constitutional:  Negative for chills,  "diaphoresis and fever.   HENT:  Negative for nosebleeds.    Eyes:  Negative for blurred vision, double vision and photophobia.   Respiratory:  Negative for hemoptysis, shortness of breath and wheezing.    Cardiovascular:  Negative for chest pain, palpitations, orthopnea, claudication, leg swelling and PND.   Gastrointestinal:  Negative for abdominal pain, blood in stool, heartburn, melena, nausea and vomiting.   Genitourinary:  Negative for flank pain and hematuria.   Musculoskeletal:  Negative for falls, myalgias and neck pain.   Skin:  Negative for rash.   Neurological:  Negative for dizziness, seizures, loss of consciousness, weakness and headaches.   Endo/Heme/Allergies:  Negative for polydipsia. Does not bruise/bleed easily.   Psychiatric/Behavioral:  Positive for memory loss. Negative for depression. The patient is not nervous/anxious.      PHYSICAL EXAM:     Vitals:    03/17/23 0950   BP: 118/62   Pulse: (!) 59   Resp: 18    Body mass index is 19.84 kg/m².  Weight: 49.2 kg (108 lb 7.5 oz)   Height: 5' 2" (157.5 cm)     Physical Exam  Vitals reviewed.   Constitutional:       General: She is not in acute distress.     Appearance: Normal appearance. She is well-developed and normal weight. She is not ill-appearing, toxic-appearing or diaphoretic.   HENT:      Head: Normocephalic and atraumatic.   Eyes:      General: No scleral icterus.     Extraocular Movements: Extraocular movements intact.      Conjunctiva/sclera: Conjunctivae normal.      Pupils: Pupils are equal, round, and reactive to light.   Neck:      Thyroid: No thyromegaly.      Vascular: Normal carotid pulses. No carotid bruit or JVD.      Trachea: Trachea normal.   Cardiovascular:      Rate and Rhythm: Regular rhythm. Bradycardia present.      Pulses:           Carotid pulses are 2+ on the right side and 2+ on the left side.     Heart sounds: S1 normal and S2 normal. No murmur heard.    No friction rub. No gallop.   Pulmonary:      Effort: Pulmonary " effort is normal. No respiratory distress.      Breath sounds: Normal breath sounds. No stridor. No wheezing, rhonchi or rales.   Chest:      Chest wall: No tenderness.   Abdominal:      General: There is no distension.      Palpations: Abdomen is soft.   Musculoskeletal:         General: No swelling or tenderness. Normal range of motion.      Cervical back: Normal range of motion and neck supple. No edema or rigidity.      Right lower leg: No edema.      Left lower leg: No edema.   Feet:      Right foot:      Skin integrity: No ulcer.      Left foot:      Skin integrity: No ulcer.   Skin:     General: Skin is warm and dry.      Coloration: Skin is not jaundiced.   Neurological:      General: No focal deficit present.      Mental Status: She is alert and oriented to person, place, and time.      Cranial Nerves: No cranial nerve deficit.   Psychiatric:         Mood and Affect: Mood normal.         Speech: Speech normal.         Behavior: Behavior normal. Behavior is cooperative.       DATA:   EKG: (personally reviewed tracing)  3/17/23 SR 53, NSSTTW changes    Laboratory:  CBC:  Recent Labs   Lab 06/13/20  0120   WBC 5.27   Hemoglobin 10.7 L   Hematocrit 32.2 L   Platelets 259       CHEMISTRIES:  Recent Labs   Lab 06/13/20  0120   Glucose 98   Sodium 139   Potassium 4.4   BUN 13   Creatinine 0.9   eGFR if non African American >60   Calcium 9.4       CARDIAC BIOMARKERS:  Recent Labs   Lab 06/13/20  0120   Troponin I <0.006       COAGS:        LIPIDS/LFTS:  Recent Labs   Lab 06/13/20  0120   AST 17   ALT 10       Cardiovascular Testing:  Echo 1/28/19  Mildly decreased left ventricular systolic function. The estimated ejection fraction is 45%  Mild left ventricular enlargement.  Concentric left ventricular hypertrophy.  Grade I (mild) left ventricular diastolic dysfunction consistent with impaired relaxation.    Cath 10/30/18  Pressures:  RA 16  RV 68/19  PA 67/26/39  PAWP 23  /20  Ao 138/76/98  Thermal CO 4.91  L/min  Sats:  RA 52.5%  RV 55.8%  PA 55.3%  FA 94%  Naye CO 2.7L/min  LVEF: 15% by echo  Wall Motion: severe global HK  Dominance: Right  LM: normal  LAD: normal  LCx: normal  RCA: normal  Hemostasis:  RFA/V man compression  Impression:  Normal cors with severe LV dysfxn  Elevated LVEDP with mod-sev PHTN  Findings c/w NICM  Plan:  Cont Med rx:  ASA 81mg qd  IV lasix for goal net neg 1-2L/day  Cont coreg/ARB/aldact and titrate as HR/BP will allow  Lifevest ordered  Cont rx of hypothyroidism  Repeat echo 3 months (late Jan 2019) for reassessment of LVEF and need for ICD     LE venous US 10/27/18  No evidence of lower extremity deep venous thrombosis (bilat).    ASSESSMENT:   # NICM, appears euvolemic.  EF recovered 15%->45% on echo 1/2019.  # memory disturbance  # SDH (MRI 3/16/23)  # abnl EKG  # hyper->hypothyroidism, followed by Dr. Chisholm  # HTN, controlled     PLAN:   Cont med rx  Echo  Holter  Carotid US  Labs ordered by PCP  Continue with work with Dr. Chisholm for mgmt of thyroid disease and Dr. Castaneda for further eval of SDH.  RTC 1 month    Дмитрий Limon MD, FACC

## 2023-04-04 ENCOUNTER — HOSPITAL ENCOUNTER (OUTPATIENT)
Dept: CARDIOLOGY | Facility: HOSPITAL | Age: 53
Discharge: HOME | End: 2023-04-04
Attending: INTERNAL MEDICINE
Payer: COMMERCIAL

## 2023-04-04 DIAGNOSIS — R41.3 MEMORY DEFICIT: ICD-10-CM

## 2023-04-04 DIAGNOSIS — R94.31 ABNORMAL EKG: ICD-10-CM

## 2023-04-04 DIAGNOSIS — R00.1 BRADYCARDIA: ICD-10-CM

## 2023-04-04 DIAGNOSIS — I42.8 NONISCHEMIC CARDIOMYOPATHY: ICD-10-CM

## 2023-04-04 PROCEDURE — 93225 XTRNL ECG REC<48 HRS REC: CPT

## 2023-04-04 PROCEDURE — 93227 HOLTER MONITOR - 24 HOUR (CUPID ONLY): ICD-10-PCS | Mod: ,,, | Performed by: INTERNAL MEDICINE

## 2023-04-04 PROCEDURE — 93227 XTRNL ECG REC<48 HR R&I: CPT | Mod: ,,, | Performed by: INTERNAL MEDICINE

## 2023-04-05 LAB
OHS CV EVENT MONITOR DAY: 1
OHS CV HOLTER LENGTH DECIMAL HOURS: 48
OHS CV HOLTER LENGTH HOURS: 24
OHS CV HOLTER LENGTH MINUTES: 0
OHS CV HOLTER SINUS AVERAGE HR: 74
OHS CV HOLTER SINUS MAX HR: 143
OHS CV HOLTER SINUS MIN HR: 40

## 2023-04-14 ENCOUNTER — HOSPITAL ENCOUNTER (OUTPATIENT)
Dept: CARDIOLOGY | Facility: HOSPITAL | Age: 53
Discharge: HOME | End: 2023-04-14
Attending: INTERNAL MEDICINE
Payer: COMMERCIAL

## 2023-04-14 DIAGNOSIS — I42.8 NONISCHEMIC CARDIOMYOPATHY: ICD-10-CM

## 2023-04-14 DIAGNOSIS — R94.31 ABNORMAL EKG: ICD-10-CM

## 2023-04-14 DIAGNOSIS — R41.3 MEMORY DEFICIT: ICD-10-CM

## 2023-04-14 DIAGNOSIS — R00.1 BRADYCARDIA: ICD-10-CM

## 2023-04-14 DIAGNOSIS — S06.5XAA SDH (SUBDURAL HEMATOMA): ICD-10-CM

## 2023-04-14 LAB
ASCENDING AORTA: 2.43 CM
AV INDEX (PROSTH): 0.78
AV MEAN GRADIENT: 5 MMHG
AV PEAK GRADIENT: 10 MMHG
AV VALVE AREA: 2.49 CM2
AV VELOCITY RATIO: 0.81
CV ECHO LV RWT: 0.31 CM
DOP CALC AO PEAK VEL: 1.62 M/S
DOP CALC AO VTI: 35.7 CM
DOP CALC LVOT AREA: 3.2 CM2
DOP CALC LVOT DIAMETER: 2.02 CM
DOP CALC LVOT PEAK VEL: 1.31 M/S
DOP CALC LVOT STROKE VOLUME: 89.05 CM3
DOP CALC MV VTI: 34.6 CM
DOP CALCLVOT PEAK VEL VTI: 27.8 CM
E WAVE DECELERATION TIME: 127.02 MSEC
E/A RATIO: 1.01
E/E' RATIO: 13.86 M/S
ECHO LV POSTERIOR WALL: 0.91 CM (ref 0.6–1.1)
EJECTION FRACTION: 40 %
FRACTIONAL SHORTENING: 17 % (ref 28–44)
INTERVENTRICULAR SEPTUM: 0.92 CM (ref 0.6–1.1)
IVC DIAMETER: 1.42 CM
IVRT: 117.65 MSEC
LA MAJOR: 5.05 CM
LA MINOR: 4.8 CM
LA WIDTH: 4.2 CM
LEFT ATRIUM SIZE: 3.52 CM
LEFT ATRIUM VOLUME: 61.85 CM3
LEFT INTERNAL DIMENSION IN SYSTOLE: 4.82 CM (ref 2.1–4)
LEFT VENTRICLE DIASTOLIC VOLUME: 169.31 ML
LEFT VENTRICLE SYSTOLIC VOLUME: 108.74 ML
LEFT VENTRICULAR INTERNAL DIMENSION IN DIASTOLE: 5.84 CM (ref 3.5–6)
LEFT VENTRICULAR MASS: 210.3 G
LV LATERAL E/E' RATIO: 10.78 M/S
LV SEPTAL E/E' RATIO: 19.4 M/S
LVOT MG: 3.22 MMHG
LVOT MV: 0.83 CM/S
MV MEAN GRADIENT: 3 MMHG
MV PEAK A VEL: 0.96 M/S
MV PEAK E VEL: 0.97 M/S
MV PEAK GRADIENT: 5 MMHG
MV STENOSIS PRESSURE HALF TIME: 36.84 MS
MV VALVE AREA BY CONTINUITY EQUATION: 2.57 CM2
MV VALVE AREA P 1/2 METHOD: 5.97 CM2
PISA MRMAX VEL: 6.18 M/S
PISA TR MAX VEL: 2.06 M/S
PULM VEIN S/D RATIO: 1.58
PV PEAK D VEL: 0.38 M/S
PV PEAK S VEL: 0.6 M/S
PV PEAK VELOCITY: 1.05 CM/S
RA MAJOR: 5.08 CM
RA PRESSURE: 3 MMHG
RA WIDTH: 4 CM
RIGHT VENTRICULAR END-DIASTOLIC DIMENSION: 3.78 CM
SINUS: 2.7 CM
STJ: 1.8 CM
TDI LATERAL: 0.09 M/S
TDI SEPTAL: 0.05 M/S
TDI: 0.07 M/S
TR MAX PG: 17 MMHG
TRICUSPID ANNULAR PLANE SYSTOLIC EXCURSION: 1.9 CM
TV REST PULMONARY ARTERY PRESSURE: 20 MMHG

## 2023-04-14 PROCEDURE — 93306 ECHO (CUPID ONLY): ICD-10-PCS | Mod: 26,,, | Performed by: INTERNAL MEDICINE

## 2023-04-14 PROCEDURE — 93306 TTE W/DOPPLER COMPLETE: CPT

## 2023-04-14 PROCEDURE — 93880 CV US DOPPLER CAROTID (CUPID ONLY): ICD-10-PCS | Mod: 26,,, | Performed by: INTERNAL MEDICINE

## 2023-04-14 PROCEDURE — 93306 TTE W/DOPPLER COMPLETE: CPT | Mod: 26,,, | Performed by: INTERNAL MEDICINE

## 2023-04-14 PROCEDURE — 93880 EXTRACRANIAL BILAT STUDY: CPT

## 2023-04-14 PROCEDURE — 93880 EXTRACRANIAL BILAT STUDY: CPT | Mod: 26,,, | Performed by: INTERNAL MEDICINE

## 2023-04-15 LAB
LEFT CBA DIAS: 18 CM/S
LEFT CBA SYS: 78 CM/S
LEFT CCA DIST DIAS: 23 CM/S
LEFT CCA DIST SYS: 82 CM/S
LEFT CCA MID DIAS: 18 CM/S
LEFT CCA MID SYS: 85 CM/S
LEFT CCA PROX DIAS: 23 CM/S
LEFT CCA PROX SYS: 117 CM/S
LEFT ECA DIAS: 15 CM/S
LEFT ECA SYS: 90 CM/S
LEFT ICA DIST DIAS: 36 CM/S
LEFT ICA DIST SYS: 106 CM/S
LEFT ICA MID DIAS: 25 CM/S
LEFT ICA MID SYS: 95 CM/S
LEFT ICA PROX DIAS: 21 CM/S
LEFT ICA PROX SYS: 88 CM/S
LEFT VERTEBRAL DIAS: 23 CM/S
LEFT VERTEBRAL SYS: 78 CM/S
OHS CV CAROTID RIGHT ICA EDV HIGHEST: 32
OHS CV CAROTID ULTRASOUND LEFT ICA/CCA RATIO: 1.29
OHS CV CAROTID ULTRASOUND RIGHT ICA/CCA RATIO: 1.97
OHS CV PV CAROTID LEFT HIGHEST CCA: 117
OHS CV PV CAROTID LEFT HIGHEST ICA: 106
OHS CV PV CAROTID RIGHT HIGHEST CCA: 68
OHS CV PV CAROTID RIGHT HIGHEST ICA: 122
OHS CV US CAROTID LEFT HIGHEST EDV: 36
RIGHT CBA DIAS: 11 CM/S
RIGHT CBA SYS: 37 CM/S
RIGHT CCA DIST DIAS: 10 CM/S
RIGHT CCA DIST SYS: 62 CM/S
RIGHT CCA MID DIAS: 11 CM/S
RIGHT CCA MID SYS: 65 CM/S
RIGHT CCA PROX DIAS: 10 CM/S
RIGHT CCA PROX SYS: 68 CM/S
RIGHT ECA DIAS: 22 CM/S
RIGHT ECA SYS: 92 CM/S
RIGHT ICA DIST DIAS: 32 CM/S
RIGHT ICA DIST SYS: 122 CM/S
RIGHT ICA MID DIAS: 14 CM/S
RIGHT ICA MID SYS: 51 CM/S
RIGHT ICA PROX DIAS: 14 CM/S
RIGHT ICA PROX SYS: 53 CM/S
RIGHT VERTEBRAL DIAS: 15 CM/S
RIGHT VERTEBRAL SYS: 74 CM/S

## 2023-05-03 ENCOUNTER — OFFICE VISIT (OUTPATIENT)
Dept: CARDIOLOGY | Facility: CLINIC | Age: 53
End: 2023-05-03
Payer: COMMERCIAL

## 2023-05-03 VITALS
HEART RATE: 58 BPM | SYSTOLIC BLOOD PRESSURE: 110 MMHG | WEIGHT: 113.63 LBS | HEIGHT: 62 IN | BODY MASS INDEX: 20.91 KG/M2 | RESPIRATION RATE: 18 BRPM | DIASTOLIC BLOOD PRESSURE: 68 MMHG | OXYGEN SATURATION: 99 %

## 2023-05-03 DIAGNOSIS — R00.1 BRADYCARDIA: ICD-10-CM

## 2023-05-03 DIAGNOSIS — E03.9 HYPOTHYROIDISM, UNSPECIFIED TYPE: ICD-10-CM

## 2023-05-03 DIAGNOSIS — I65.23 CAROTID ATHEROSCLEROSIS, BILATERAL: ICD-10-CM

## 2023-05-03 DIAGNOSIS — R94.31 ABNORMAL EKG: ICD-10-CM

## 2023-05-03 DIAGNOSIS — R41.3 MEMORY DEFICIT: Primary | ICD-10-CM

## 2023-05-03 DIAGNOSIS — S06.5XAA SDH (SUBDURAL HEMATOMA): ICD-10-CM

## 2023-05-03 DIAGNOSIS — I42.8 NONISCHEMIC CARDIOMYOPATHY: ICD-10-CM

## 2023-05-03 DIAGNOSIS — I10 ESSENTIAL HYPERTENSION: ICD-10-CM

## 2023-05-03 PROCEDURE — 1159F MED LIST DOCD IN RCRD: CPT | Mod: CPTII,S$GLB,, | Performed by: INTERNAL MEDICINE

## 2023-05-03 PROCEDURE — 4010F PR ACE/ARB THEARPY RXD/TAKEN: ICD-10-PCS | Mod: CPTII,S$GLB,, | Performed by: INTERNAL MEDICINE

## 2023-05-03 PROCEDURE — 4010F ACE/ARB THERAPY RXD/TAKEN: CPT | Mod: CPTII,S$GLB,, | Performed by: INTERNAL MEDICINE

## 2023-05-03 PROCEDURE — 3078F PR MOST RECENT DIASTOLIC BLOOD PRESSURE < 80 MM HG: ICD-10-PCS | Mod: CPTII,S$GLB,, | Performed by: INTERNAL MEDICINE

## 2023-05-03 PROCEDURE — 99214 OFFICE O/P EST MOD 30 MIN: CPT | Mod: S$GLB,,, | Performed by: INTERNAL MEDICINE

## 2023-05-03 PROCEDURE — 99999 PR PBB SHADOW E&M-EST. PATIENT-LVL IV: CPT | Mod: PBBFAC,,, | Performed by: INTERNAL MEDICINE

## 2023-05-03 PROCEDURE — 3008F PR BODY MASS INDEX (BMI) DOCUMENTED: ICD-10-PCS | Mod: CPTII,S$GLB,, | Performed by: INTERNAL MEDICINE

## 2023-05-03 PROCEDURE — 3074F SYST BP LT 130 MM HG: CPT | Mod: CPTII,S$GLB,, | Performed by: INTERNAL MEDICINE

## 2023-05-03 PROCEDURE — 1160F RVW MEDS BY RX/DR IN RCRD: CPT | Mod: CPTII,S$GLB,, | Performed by: INTERNAL MEDICINE

## 2023-05-03 PROCEDURE — 99214 PR OFFICE/OUTPT VISIT, EST, LEVL IV, 30-39 MIN: ICD-10-PCS | Mod: S$GLB,,, | Performed by: INTERNAL MEDICINE

## 2023-05-03 PROCEDURE — 99999 PR PBB SHADOW E&M-EST. PATIENT-LVL IV: ICD-10-PCS | Mod: PBBFAC,,, | Performed by: INTERNAL MEDICINE

## 2023-05-03 PROCEDURE — 1159F PR MEDICATION LIST DOCUMENTED IN MEDICAL RECORD: ICD-10-PCS | Mod: CPTII,S$GLB,, | Performed by: INTERNAL MEDICINE

## 2023-05-03 PROCEDURE — 3008F BODY MASS INDEX DOCD: CPT | Mod: CPTII,S$GLB,, | Performed by: INTERNAL MEDICINE

## 2023-05-03 PROCEDURE — 3078F DIAST BP <80 MM HG: CPT | Mod: CPTII,S$GLB,, | Performed by: INTERNAL MEDICINE

## 2023-05-03 PROCEDURE — 3074F PR MOST RECENT SYSTOLIC BLOOD PRESSURE < 130 MM HG: ICD-10-PCS | Mod: CPTII,S$GLB,, | Performed by: INTERNAL MEDICINE

## 2023-05-03 PROCEDURE — 1160F PR REVIEW ALL MEDS BY PRESCRIBER/CLIN PHARMACIST DOCUMENTED: ICD-10-PCS | Mod: CPTII,S$GLB,, | Performed by: INTERNAL MEDICINE

## 2023-05-03 NOTE — PROGRESS NOTES
CARDIOVASCULAR PROGRESS NOTE    REASON FOR CONSULT:   Vipin Whitaker is a 52 y.o. female who presents for f/u NICM, memory deficit, testing.    PCP: Kathrine  Neuro: Leonel  HISTORY OF PRESENT ILLNESS:   The patient returns for follow-up, accompanied by her .  Her son joined us via cell phone.  She continues to have memory impairment.  She denies any angina, dyspnea, palpitations, or syncope.  There has been no PND, orthopnea, melena, hematuria, or claudicant symptoms.      Her echocardiogram notes stable mild LV dysfunction with an EF of 40 percent.  Her Holter noted episodic junctional rhythm at 2:30 a.m. otherwise sinus rhythm.  There is no evidence of atrial fibrillation.  Her carotid ultrasound noted stable bilateral mild carotid atherosclerosis.    CARDIOVASCULAR HISTORY:   NICM (cath 10/2018)    Carotid atherosclerosis (CUS 4/2023)    PAST MEDICAL HISTORY:     Past Medical History:   Diagnosis Date    Abnormal Pap smear     Acute combined systolic and diastolic congestive heart failure 10/30/2018    Depression     Hospital discharge follow-up 11/5/2018    Hypertension 10/27/2018    Normocytic anemia 10/28/2018    Thyroid disease        PAST SURGICAL HISTORY:     Past Surgical History:   Procedure Laterality Date    CATHETERIZATION OF BOTH LEFT AND RIGHT HEART Bilateral 10/30/2018    Procedure: CATHETERIZATION, HEART, BOTH LEFT AND RIGHT with sat run. RFA access;  Surgeon: Дмитрий Limon MD;  Location: [facility] CATH LAB;  Service: Cardiology;  Laterality: Bilateral;    COLONOSCOPY N/A 11/3/2017    Procedure: COLONOSCOPY;  Surgeon: Leighton Ross MD;  Location: [facility] ENDO;  Service: Endoscopy;  Laterality: N/A;    SALPINGECTOMY         ALLERGIES AND MEDICATION:     Review of patient's allergies indicates:   Allergen Reactions    Memantine Itching        Medication List            Accurate as of May 3, 2023 11:24 AM. If you have any questions, ask your nurse or doctor.                CONTINUE taking  these medications      carvediloL 6.25 MG tablet  Commonly known as: COREG  Take 1 tablet (6.25 mg total) by mouth 2 (two) times daily.     furosemide 40 MG tablet  Commonly known as: LASIX  TAKE 1 TABLET BY MOUTH TWICE A DAY     ibuprofen 600 MG tablet  Commonly known as: ADVIL,MOTRIN  Take 1 tablet (600 mg total) by mouth every 6 (six) hours as needed.     losartan 50 MG tablet  Commonly known as: COZAAR  Take 1 tablet (50 mg total) by mouth once daily.     methIMAzole 10 MG Tab  Commonly known as: TAPAZOLE  Take 1 tablet (10 mg total) by mouth once daily.     * oxyCODONE-acetaminophen 5-325 mg per tablet  Commonly known as: PERCOCET  Take 1 tablet by mouth every 6 (six) hours as needed.     * oxyCODONE-acetaminophen 5-325 mg per tablet  Commonly known as: PERCOCET  Take 1 tablet by mouth every 4 (four) hours as needed for Pain.     spironolactone 25 MG tablet  Commonly known as: ALDACTONE  Take 1 tablet (25 mg total) by mouth once daily.           * This list has 2 medication(s) that are the same as other medications prescribed for you. Read the directions carefully, and ask your doctor or other care provider to review them with you.                  SOCIAL HISTORY:     Social History     Socioeconomic History    Marital status:    Tobacco Use    Smoking status: Never    Smokeless tobacco: Never   Substance and Sexual Activity    Alcohol use: No    Drug use: No    Sexual activity: Yes     Partners: Male     Birth control/protection: None       FAMILY HISTORY:     Family History   Problem Relation Age of Onset    Colon cancer Mother     Hypertension Mother     No Known Problems Father     Cancer Sister     No Known Problems Brother     Cancer Maternal Aunt     Heart murmur Maternal Uncle        REVIEW OF SYSTEMS:   Review of Systems   Constitutional:  Negative for chills, diaphoresis and fever.   HENT:  Negative for nosebleeds.    Eyes:  Negative for blurred vision, double vision and photophobia.  "  Respiratory:  Negative for hemoptysis, shortness of breath and wheezing.    Cardiovascular:  Negative for chest pain, palpitations, orthopnea, claudication, leg swelling and PND.   Gastrointestinal:  Negative for abdominal pain, blood in stool, heartburn, melena, nausea and vomiting.   Genitourinary:  Negative for flank pain and hematuria.   Musculoskeletal:  Negative for falls, myalgias and neck pain.   Skin:  Negative for rash.   Neurological:  Negative for dizziness, seizures, loss of consciousness, weakness and headaches.   Endo/Heme/Allergies:  Negative for polydipsia. Does not bruise/bleed easily.   Psychiatric/Behavioral:  Positive for memory loss. Negative for depression. The patient is not nervous/anxious.      PHYSICAL EXAM:     Vitals:    05/03/23 1046   BP: 110/68   Pulse: (!) 58   Resp: 18    Body mass index is 20.79 kg/m².  Weight: 51.5 kg (113 lb 10.4 oz)   Height: 5' 2" (157.5 cm)     Physical Exam  Vitals reviewed.   Constitutional:       General: She is not in acute distress.     Appearance: Normal appearance. She is well-developed and normal weight. She is not ill-appearing, toxic-appearing or diaphoretic.   HENT:      Head: Normocephalic and atraumatic.   Eyes:      General: No scleral icterus.     Extraocular Movements: Extraocular movements intact.      Conjunctiva/sclera: Conjunctivae normal.      Pupils: Pupils are equal, round, and reactive to light.   Neck:      Thyroid: No thyromegaly.      Vascular: Normal carotid pulses. No carotid bruit or JVD.      Trachea: Trachea normal.   Cardiovascular:      Rate and Rhythm: Regular rhythm. Bradycardia present.      Pulses:           Carotid pulses are 2+ on the right side and 2+ on the left side.     Heart sounds: S1 normal and S2 normal. No murmur heard.    No friction rub. No gallop.   Pulmonary:      Effort: Pulmonary effort is normal. No respiratory distress.      Breath sounds: Normal breath sounds. No stridor. No wheezing, rhonchi or " rales.   Chest:      Chest wall: No tenderness.   Abdominal:      General: There is no distension.      Palpations: Abdomen is soft.   Musculoskeletal:         General: No swelling or tenderness. Normal range of motion.      Cervical back: Normal range of motion and neck supple. No edema or rigidity.      Right lower leg: No edema.      Left lower leg: No edema.   Feet:      Right foot:      Skin integrity: No ulcer.      Left foot:      Skin integrity: No ulcer.   Skin:     General: Skin is warm and dry.      Coloration: Skin is not jaundiced.   Neurological:      General: No focal deficit present.      Mental Status: She is alert and oriented to person, place, and time.      Cranial Nerves: No cranial nerve deficit.   Psychiatric:         Mood and Affect: Mood normal.         Speech: Speech normal.         Behavior: Behavior normal. Behavior is cooperative.       DATA:   EKG: (personally reviewed tracing)  3/17/23 SR 53, NSSTTW changes    Laboratory:  CBC:  Recent Labs   Lab 06/13/20  0120   WBC 5.27   Hemoglobin 10.7 L   Hematocrit 32.2 L   Platelets 259         CHEMISTRIES:  Recent Labs   Lab 06/13/20  0120   Glucose 98   Sodium 139   Potassium 4.4   BUN 13   Creatinine 0.9   eGFR if non African American >60   Calcium 9.4         CARDIAC BIOMARKERS:  Recent Labs   Lab 06/13/20  0120   Troponin I <0.006         COAGS:        LIPIDS/LFTS:  Recent Labs   Lab 06/13/20  0120   AST 17   ALT 10         Cardiovascular Testing:  Echo 4/14/23 (EF similar to 1/2019)  The left ventricle is mildly enlarged with eccentric hypertrophy and mildly decreased systolic function.  The estimated ejection fraction is 40%.  Grade I left ventricular diastolic dysfunction.  Normal right ventricular size with normal right ventricular systolic function.  Mild left atrial enlargement.  Mild-to-moderate mitral regurgitation.  Normal central venous pressure (3 mmHg).  The estimated PA systolic pressure is 20 mmHg.    Carotid US 4/14/23  There  is 20-39% right Internal Carotid Stenosis.  There is 20-39% left Internal Carotid Stenosis.    Holter 4/4/23  Sinus rhythm with heart rates varying between 40 and 143 BPM with an average of 74BPM.  Junctional rhythm with HR in 40s noted at 230am.  There were occasional PVCs totalling 983 and averaging 20.48 per hour. There were 11 bigeminal cycles. There were 6 triplets.  There were occasional PACs totalling 624 and averaging 13 per hour.  The diary was not returned.    Cath 10/30/18  Pressures:  RA 16  RV 68/19  PA 67/26/39  PAWP 23  /20  Ao 138/76/98  Thermal CO 4.91 L/min  Sats:  RA 52.5%  RV 55.8%  PA 55.3%  FA 94%  Naye CO 2.7L/min  LVEF: 15% by echo  Wall Motion: severe global HK  Dominance: Right  LM: normal  LAD: normal  LCx: normal  RCA: normal  Hemostasis:  RFA/V man compression  Impression:  Normal cors with severe LV dysfxn  Elevated LVEDP with mod-sev PHTN  Findings c/w NICM  Plan:  Cont Med rx:  ASA 81mg qd  IV lasix for goal net neg 1-2L/day  Cont coreg/ARB/aldact and titrate as HR/BP will allow  Lifevest ordered  Cont rx of hypothyroidism  Repeat echo 3 months (late Jan 2019) for reassessment of LVEF and need for ICD     LE venous US 10/27/18  No evidence of lower extremity deep venous thrombosis (bilat).    ASSESSMENT:   # NICM, appears euvolemic.  EF recovered 15%->45%->40% on echo 4/2023.  # memory disturbance, following with Dr. Castaneda.  No evidence of CVA on brain MRI 3/2023.  No evidence of CV etiology on above testing.  # SDH (MRI 3/16/23)  # bradycardia, holter 4/2023 without CHB or PAF  # hyper->hypothyroidism, followed by Dr. Chisholm  # HTN, controlled     PLAN:   Cont med rx  Dec coreg 3.125mg bid  Continue with work with Dr. Chisholm for mgmt of thyroid disease and Dr. Castaneda for further eval of SDH.  RTC 1 year with surveillance echo and carotid US (May 2024)    Дмитрий Limon MD, [facility]

## 2024-06-13 DIAGNOSIS — I42.8 NON-ISCHEMIC CARDIOMYOPATHY: ICD-10-CM

## 2024-06-13 RX ORDER — SPIRONOLACTONE 25 MG/1
25 TABLET ORAL DAILY
Qty: 90 TABLET | Refills: 0 | Status: SHIPPED | OUTPATIENT
Start: 2024-06-13

## 2024-06-13 NOTE — TELEPHONE ENCOUNTER
Please call patient to confirm follow up Office Visit in August with testing as previously ordered if not yet complete.  No further refills will be authorized without being seen as planned.

## 2024-06-13 NOTE — TELEPHONE ENCOUNTER
----- Message from Rubi So sent at 6/13/2024 12:24 PM CDT -----  .Type: RX Refill Request    Who Called: Amish - son    Have you contacted your pharmacy: No     Refill or New Rx: New Rx     RX Name and Strength:spironolactone (ALDACTONE) 25 MG tablet    Preferred Pharmacy with phone number: .  [facility]/pharmacy #[id number] - [place] - [address]  [address]  [place] [address]  Phone: [phone] Fax: [phone]        Local or Mail Order: Local     Ordering Provider:Fabián Chisholm MD    Would the patient rather a call back or a response via [facility]? Call Back     Best Call Back Number:.[phone] (home) [phone] (work)      Additional Information:

## 2024-07-10 DIAGNOSIS — I42.8 NON-ISCHEMIC CARDIOMYOPATHY: ICD-10-CM

## 2024-07-10 RX ORDER — SPIRONOLACTONE 25 MG/1
25 TABLET ORAL DAILY
Qty: 90 TABLET | Refills: 0 | Status: SHIPPED | OUTPATIENT
Start: 2024-07-10

## 2024-08-15 ENCOUNTER — HOSPITAL ENCOUNTER (OUTPATIENT)
Dept: CARDIOLOGY | Facility: HOSPITAL | Age: 54
Discharge: HOME | End: 2024-08-15
Attending: INTERNAL MEDICINE
Payer: COMMERCIAL

## 2024-08-15 DIAGNOSIS — I10 ESSENTIAL HYPERTENSION: ICD-10-CM

## 2024-08-15 DIAGNOSIS — I42.8 NONISCHEMIC CARDIOMYOPATHY: ICD-10-CM

## 2024-08-15 DIAGNOSIS — R94.31 ABNORMAL EKG: ICD-10-CM

## 2024-08-15 DIAGNOSIS — R00.1 BRADYCARDIA: ICD-10-CM

## 2024-08-15 DIAGNOSIS — I65.23 CAROTID ATHEROSCLEROSIS, BILATERAL: ICD-10-CM

## 2024-08-15 LAB
ASCENDING AORTA: 2.9 CM
AV INDEX (PROSTH): 0.69
AV MEAN GRADIENT: 4 MMHG
AV PEAK GRADIENT: 7 MMHG
AV VALVE AREA BY VELOCITY RATIO: 2.45 CM²
AV VALVE AREA: 2.11 CM²
AV VELOCITY RATIO: 0.8
CV ECHO LV RWT: 0.38 CM
DOP CALC AO PEAK VEL: 1.32 M/S
DOP CALC AO VTI: 36.4 CM
DOP CALC LVOT AREA: 3 CM2
DOP CALC LVOT DIAMETER: 1.97 CM
DOP CALC LVOT PEAK VEL: 1.06 M/S
DOP CALC LVOT STROKE VOLUME: 76.77 CM3
DOP CALCLVOT PEAK VEL VTI: 25.2 CM
E WAVE DECELERATION TIME: 193.85 MSEC
E/A RATIO: 0.95
E/E' RATIO: 11.14 M/S
ECHO LV POSTERIOR WALL: 0.94 CM (ref 0.6–1.1)
FRACTIONAL SHORTENING: 12 % (ref 28–44)
INTERVENTRICULAR SEPTUM: 0.8 CM (ref 0.6–1.1)
IVC DIAMETER: 1.42 CM
IVRT: 122.74 MSEC
LA MAJOR: 5.1 CM
LA MINOR: 3.16 CM
LA WIDTH: 3.2 CM
LEFT ATRIUM SIZE: 4.37 CM
LEFT ATRIUM VOLUME: 46.38 CM3
LEFT CBA DIAS: 6 CM/S
LEFT CBA SYS: 53 CM/S
LEFT CCA DIST DIAS: 12 CM/S
LEFT CCA DIST SYS: 53 CM/S
LEFT CCA MID DIAS: 14 CM/S
LEFT CCA MID SYS: 68 CM/S
LEFT CCA PROX DIAS: 12 CM/S
LEFT CCA PROX SYS: 85 CM/S
LEFT ECA DIAS: 14 CM/S
LEFT ECA SYS: 95 CM/S
LEFT ICA DIST DIAS: 36 CM/S
LEFT ICA DIST SYS: 131 CM/S
LEFT ICA MID DIAS: 17 CM/S
LEFT ICA MID SYS: 75 CM/S
LEFT ICA PROX DIAS: 12 CM/S
LEFT ICA PROX SYS: 65 CM/S
LEFT INTERNAL DIMENSION IN SYSTOLE: 4.34 CM (ref 2.1–4)
LEFT VENTRICLE DIASTOLIC VOLUME: 113.99 ML
LEFT VENTRICLE SYSTOLIC VOLUME: 85.05 ML
LEFT VENTRICULAR INTERNAL DIMENSION IN DIASTOLE: 4.92 CM (ref 3.5–6)
LEFT VENTRICULAR MASS: 147.29 G
LEFT VERTEBRAL DIAS: 14 CM/S
LEFT VERTEBRAL SYS: 71 CM/S
LV LATERAL E/E' RATIO: 7.09 M/S
LV SEPTAL E/E' RATIO: 26 M/S
LVED V (TEICH): 113.99 ML
LVES V (TEICH): 85.05 ML
LVOT MG: 2.31 MMHG
LVOT MV: 0.72 CM/S
MV PEAK A VEL: 0.82 M/S
MV PEAK E VEL: 0.78 M/S
MV STENOSIS PRESSURE HALF TIME: 56.22 MS
MV VALVE AREA P 1/2 METHOD: 3.91 CM2
OHS CV CAROTID RIGHT ICA EDV HIGHEST: 37
OHS CV CAROTID ULTRASOUND LEFT ICA/CCA RATIO: 2.47
OHS CV CAROTID ULTRASOUND RIGHT ICA/CCA RATIO: 2.77
OHS CV PV CAROTID LEFT HIGHEST CCA: 85
OHS CV PV CAROTID LEFT HIGHEST ICA: 131
OHS CV PV CAROTID RIGHT HIGHEST CCA: 52
OHS CV PV CAROTID RIGHT HIGHEST ICA: 97
OHS CV RV/LV RATIO: 0.65 CM
OHS CV US CAROTID LEFT HIGHEST EDV: 36
PISA TR MAX VEL: 1.49 M/S
PULM VEIN S/D RATIO: 1.2
PV PEAK D VEL: 0.41 M/S
PV PEAK GRADIENT: 2 MMHG
PV PEAK S VEL: 0.49 M/S
PV PEAK VELOCITY: 0.76 M/S
RA MAJOR: 4.7 CM
RA PRESSURE ESTIMATED: 8 MMHG
RA WIDTH: 3.51 CM
RIGHT CBA DIAS: 9 CM/S
RIGHT CBA SYS: 36 CM/S
RIGHT CCA DIST DIAS: 8 CM/S
RIGHT CCA DIST SYS: 35 CM/S
RIGHT CCA MID DIAS: 11 CM/S
RIGHT CCA MID SYS: 52 CM/S
RIGHT CCA PROX DIAS: 9 CM/S
RIGHT CCA PROX SYS: 44 CM/S
RIGHT ECA DIAS: 22 CM/S
RIGHT ECA SYS: 99 CM/S
RIGHT ICA DIST DIAS: 37 CM/S
RIGHT ICA DIST SYS: 97 CM/S
RIGHT ICA MID DIAS: 16 CM/S
RIGHT ICA MID SYS: 46 CM/S
RIGHT ICA PROX DIAS: 9 CM/S
RIGHT ICA PROX SYS: 42 CM/S
RIGHT VENTRICLE DIASTOLIC BASEL DIMENSION: 3.2 CM
RIGHT VENTRICULAR END-DIASTOLIC DIMENSION: 3.18 CM
RIGHT VERTEBRAL DIAS: 14 CM/S
RIGHT VERTEBRAL SYS: 68 CM/S
RV TB RVSP: 9 MMHG
SINUS: 3.08 CM
STJ: 2.53 CM
TDI LATERAL: 0.11 M/S
TDI SEPTAL: 0.03 M/S
TDI: 0.07 M/S
TR MAX PG: 9 MMHG
TRICUSPID ANNULAR PLANE SYSTOLIC EXCURSION: 2.17 CM
TV PEAK GRADIENT: 1 MMHG
TV REST PULMONARY ARTERY PRESSURE: 17 MMHG

## 2024-08-15 PROCEDURE — 93306 TTE W/DOPPLER COMPLETE: CPT

## 2024-08-15 PROCEDURE — 93880 EXTRACRANIAL BILAT STUDY: CPT

## 2024-08-16 ENCOUNTER — OFFICE VISIT (OUTPATIENT)
Dept: CARDIOLOGY | Facility: CLINIC | Age: 54
End: 2024-08-16
Payer: COMMERCIAL

## 2024-08-16 VITALS
HEIGHT: 62 IN | WEIGHT: 145.63 LBS | BODY MASS INDEX: 26.8 KG/M2 | SYSTOLIC BLOOD PRESSURE: 118 MMHG | HEART RATE: 59 BPM | RESPIRATION RATE: 18 BRPM | DIASTOLIC BLOOD PRESSURE: 90 MMHG | OXYGEN SATURATION: 97 %

## 2024-08-16 DIAGNOSIS — I42.8 NONISCHEMIC CARDIOMYOPATHY: ICD-10-CM

## 2024-08-16 DIAGNOSIS — R41.3 MEMORY DEFICIT: ICD-10-CM

## 2024-08-16 DIAGNOSIS — R94.31 ABNORMAL EKG: ICD-10-CM

## 2024-08-16 DIAGNOSIS — I65.23 CAROTID ATHEROSCLEROSIS, BILATERAL: ICD-10-CM

## 2024-08-16 DIAGNOSIS — I10 ESSENTIAL HYPERTENSION: ICD-10-CM

## 2024-08-16 DIAGNOSIS — I10 HYPERTENSION, UNSPECIFIED TYPE: ICD-10-CM

## 2024-08-16 DIAGNOSIS — I42.8 NON-ISCHEMIC CARDIOMYOPATHY: Primary | ICD-10-CM

## 2024-08-16 PROCEDURE — 99999 PR PBB SHADOW E&M-EST. PATIENT-LVL IV: CPT | Mod: PBBFAC,,, | Performed by: INTERNAL MEDICINE

## 2024-08-16 RX ORDER — ERGOCALCIFEROL 1.25 1/1
50000 CAPSULE ORAL
COMMUNITY
Start: 2024-06-25

## 2024-08-16 RX ORDER — LOSARTAN POTASSIUM 100 MG/1
100 TABLET ORAL DAILY
Qty: 90 TABLET | Refills: 3 | Status: SHIPPED | OUTPATIENT
Start: 2024-08-16

## 2024-08-16 RX ORDER — SPIRONOLACTONE 25 MG/1
25 TABLET ORAL DAILY
Qty: 90 TABLET | Refills: 3 | Status: SHIPPED | OUTPATIENT
Start: 2024-08-16

## 2024-08-16 RX ORDER — LOSARTAN POTASSIUM 100 MG/1
100 TABLET ORAL DAILY
COMMUNITY
End: 2024-08-16 | Stop reason: SDUPTHER

## 2024-08-16 RX ORDER — DONEPEZIL HYDROCHLORIDE 10 MG/1
10 TABLET, FILM COATED ORAL DAILY
COMMUNITY
Start: 2024-06-25

## 2024-08-16 RX ORDER — LEVOTHYROXINE SODIUM 125 UG/1
TABLET ORAL
COMMUNITY

## 2024-08-16 NOTE — PROGRESS NOTES
CARDIOVASCULAR PROGRESS NOTE    REASON FOR CONSULT:   Vipin Whitaker is a 53 y.o. female who presents for f/u NICM, memory deficit, testing.    PCP: Kathrine  Neuro: Leonel  HISTORY OF PRESENT ILLNESS:   Last seen May 2023    The patient returns for follow up, accompanied by her .  Her son calls in via cell phone.  She reports generally stable status without angina dyspnea, palpitations, or syncope.  There has been no PND, orthopnea, melena, hematuria, or claudication symptoms.  They tell me she has had voluminous lab testing at her primary care physician's office and I have made request for these labs.      I reviewed the results of her carotid ultrasound and echocardiogram which revealed essentially stable findings.    CARDIOVASCULAR HISTORY:   NICM (cath 10/2018)    Carotid atherosclerosis (CUS 4/2023)    PAST MEDICAL HISTORY:     Past Medical History:   Diagnosis Date    Abnormal Pap smear     Acute combined systolic and diastolic congestive heart failure 10/30/2018    Depression     Hospital discharge follow-up 11/5/2018    Hypertension 10/27/2018    Normocytic anemia 10/28/2018    Thyroid disease        PAST SURGICAL HISTORY:     Past Surgical History:   Procedure Laterality Date    CATHETERIZATION OF BOTH LEFT AND RIGHT HEART Bilateral 10/30/2018    Procedure: CATHETERIZATION, HEART, BOTH LEFT AND RIGHT with sat run. RFA access;  Surgeon: Дмитрий Limon MD;  Location: [facility] CATH LAB;  Service: Cardiology;  Laterality: Bilateral;    COLONOSCOPY N/A 11/3/2017    Procedure: COLONOSCOPY;  Surgeon: Leighton Ross MD;  Location: [facility] ENDO;  Service: Endoscopy;  Laterality: N/A;    SALPINGECTOMY         ALLERGIES AND MEDICATION:     Review of patient's allergies indicates:   Allergen Reactions    Memantine Itching        Medication List            Accurate as of August 16, 2024  8:31 AM. If you have any questions, ask your nurse or doctor.                CHANGE how you take these medications       losartan 100 MG tablet  Commonly known as: COZAAR  Take 1 tablet (100 mg total) by mouth once daily.  What changed: Another medication with the same name was removed. Continue taking this medication, and follow the directions you see here.  Changed by: Дмитрий Limon MD            CONTINUE taking these medications      donepeziL 10 MG tablet  Commonly known as: ARICEPT     levothyroxine 125 MCG tablet  Commonly known as: SYNTHROID     spironolactone 25 MG tablet  Commonly known as: ALDACTONE  Take 1 tablet (25 mg total) by mouth once daily.     VITAMIN D2 1,250 mcg (50,000 unit) capsule  Generic drug: ergocalciferol            STOP taking these medications      carvediloL 6.25 MG tablet  Commonly known as: COREG  Stopped by: Дмитрий Limon MD     furosemide 40 MG tablet  Commonly known as: LASIX  Stopped by: Дмитрий Limon MD     ibuprofen 600 MG tablet  Commonly known as: ADVIL,MOTRIN  Stopped by: Дмитрий Limon MD     methIMAzole 10 MG Tab  Commonly known as: TAPAZOLE  Stopped by: Дмитрий Limon MD     oxyCODONE-acetaminophen 5-325 mg per tablet  Commonly known as: PERCOCET  Stopped by: Дмитрий Limon MD               Where to Get Your Medications        These medications were sent to [facility]/pharmacy #[id number] - [place] - [address]  [address] [place] [address]      Phone: [phone]   losartan 100 MG tablet  spironolactone 25 MG tablet         SOCIAL HISTORY:     Social History     Socioeconomic History    Marital status:    Tobacco Use    Smoking status: Never    Smokeless tobacco: Never   Substance and Sexual Activity    Alcohol use: No    Drug use: No    Sexual activity: Yes     Partners: Male     Birth control/protection: None     Social Determinants of Health     Financial Resource Strain: Low Risk  (8/9/2024)    Overall Financial Resource Strain (CARDIA)     Difficulty of Paying Living Expenses: Not very hard   Food Insecurity: Food Insecurity Present  (8/9/2024)    Hunger Vital Sign     Worried About Running Out of Food in the Last Year: Sometimes true     Ran Out of Food in the Last Year: Never true   Physical Activity: Inactive (8/9/2024)    Exercise Vital Sign     Days of Exercise per Week: 0 days     Minutes of Exercise per Session: 0 min   Stress: Stress Concern Present (8/9/2024)    [employer] [place] [employer] - Occupational Stress Questionnaire     Feeling of Stress : To some extent   Housing Stability: High Risk (8/9/2024)    Housing Stability Vital Sign     Unable to Pay for Housing in the Last Year: Yes       FAMILY HISTORY:     Family History   Problem Relation Name Age of Onset    Colon cancer Mother      Hypertension Mother      No Known Problems Father      Cancer Sister      No Known Problems Brother      Cancer Maternal Aunt      Heart murmur Maternal Uncle         REVIEW OF SYSTEMS:   Review of Systems   Constitutional:  Negative for chills, diaphoresis and fever.   HENT:  Negative for nosebleeds.    Eyes:  Negative for blurred vision, double vision and photophobia.   Respiratory:  Negative for hemoptysis, shortness of breath and wheezing.    Cardiovascular:  Negative for chest pain, palpitations, orthopnea, claudication, leg swelling and PND.   Gastrointestinal:  Negative for abdominal pain, blood in stool, heartburn, melena, nausea and vomiting.   Genitourinary:  Negative for flank pain and hematuria.   Musculoskeletal:  Negative for falls, myalgias and neck pain.   Skin:  Negative for rash.   Neurological:  Negative for dizziness, seizures, loss of consciousness, weakness and headaches.   Endo/Heme/Allergies:  Negative for polydipsia. Does not bruise/bleed easily.   Psychiatric/Behavioral:  Positive for memory loss. Negative for depression. The patient is not nervous/anxious.        PHYSICAL EXAM:     Vitals:    08/16/24 0804   BP: (!) 118/90   Pulse: (!) 59   Resp: 18    Body mass index is 26.63 kg/m².  Weight: 66 kg (145 lb 9.8  "oz)   Height: 5' 2" (157.5 cm)     Physical Exam  Vitals reviewed.   Constitutional:       General: She is not in acute distress.     Appearance: Normal appearance. She is well-developed and normal weight. She is not ill-appearing, toxic-appearing or diaphoretic.   HENT:      Head: Normocephalic and atraumatic.   Eyes:      General: No scleral icterus.     Extraocular Movements: Extraocular movements intact.      Conjunctiva/sclera: Conjunctivae normal.      Pupils: Pupils are equal, round, and reactive to light.   Neck:      Thyroid: No thyromegaly.      Vascular: Normal carotid pulses. No carotid bruit or JVD.      Trachea: Trachea normal.   Cardiovascular:      Rate and Rhythm: Regular rhythm. Bradycardia present.      Pulses:           Carotid pulses are 2+ on the right side and 2+ on the left side.     Heart sounds: S1 normal and S2 normal. No murmur heard.     No friction rub. No gallop.   Pulmonary:      Effort: Pulmonary effort is normal. No respiratory distress.      Breath sounds: Normal breath sounds. No stridor. No wheezing, rhonchi or rales.   Chest:      Chest wall: No tenderness.   Abdominal:      General: There is no distension.      Palpations: Abdomen is soft.   Musculoskeletal:         General: No swelling or tenderness. Normal range of motion.      Cervical back: Normal range of motion and neck supple. No edema or rigidity.      Right lower leg: No edema.      Left lower leg: No edema.   Feet:      Right foot:      Skin integrity: No ulcer.      Left foot:      Skin integrity: No ulcer.   Skin:     General: Skin is warm and dry.      Coloration: Skin is not jaundiced.   Neurological:      General: No focal deficit present.      Mental Status: She is alert and oriented to person, place, and time.      Cranial Nerves: No cranial nerve deficit.   Psychiatric:         Mood and Affect: Mood normal.         Speech: Speech normal.         Behavior: Behavior normal. Behavior is cooperative.         DATA: " "  EKG: (personally reviewed tracing)  8/16/24 SR 52, PVCs    Laboratory:  CBC:        CHEMISTRIES:        Invalid input(s): "ESTGFRNONAFRICA"    CARDIAC BIOMARKERS:        COAGS:        LIPIDS/LFTS:        Cardiovascular Testing:  Echo 8/15/24 (EF similar to report 4/14/23).    Left Ventricle: The left ventricle is normal in size. Normal wall thickness. Mild global hypokinesis present. There is mildly reduced systolic function with a visually estimated ejection fraction of 45 - 50%.    Right Ventricle: Normal right ventricular cavity size. Systolic function is borderline low.    Carotid US 8/15/24    There is 0-19% right Internal Carotid Stenosis.    There is 40-49% left Internal Carotid Stenosis.    When compared to prior report dated 04/14/2023:  Left ICA stenosis has progressed.    Holter 4/4/23  Sinus rhythm with heart rates varying between 40 and 143 BPM with an average of 74BPM.  Junctional rhythm with HR in 40s noted at 230am.  There were occasional PVCs totalling 983 and averaging 20.48 per hour. There were 11 bigeminal cycles. There were 6 triplets.  There were occasional PACs totalling 624 and averaging 13 per hour.  The diary was not returned.    Cath 10/30/18  Pressures:  RA 16  RV 68/19  PA 67/26/39  PAWP 23  /20  Ao 138/76/98  Thermal CO 4.91 L/min  Sats:  RA 52.5%  RV 55.8%  PA 55.3%  FA 94%  Naye CO 2.7L/min  LVEF: 15% by echo  Wall Motion: severe global HK  Dominance: Right  LM: normal  LAD: normal  LCx: normal  RCA: normal  Hemostasis:  RFA/V man compression  Impression:  Normal cors with severe LV dysfxn  Elevated LVEDP with mod-sev PHTN  Findings c/w NICM  Plan:  Cont Med rx:  ASA 81mg qd  IV lasix for goal net neg 1-2L/day  Cont coreg/ARB/aldact and titrate as HR/BP will allow  Lifevest ordered  Cont rx of hypothyroidism  Repeat echo 3 months (late Jan 2019) for reassessment of LVEF and need for ICD     LE venous US 10/27/18  No evidence of lower extremity deep venous thrombosis " (bilat).    ASSESSMENT:   # NICM, appears euvolemic.  EF recovered 15%->45%->40%->45% on echo 8/2024.  # memory disturbance, following with Dr. Castaneda.  No evidence of CVA on brain MRI 3/2023.  No evidence of CV etiology on above testing.  # SDH (MRI 3/16/23)  # bradycardia, holter 4/2023 without CHB or PAF  # hyper->hypothyroidism, followed by Dr. Chisholm  # HTN, controlled  # carotid atherosclerosis (CUS 8/2024)     PLAN:   Cont med rx  Labs from Dr. Chisholm requested (hope to include CMP and lipids)  RTC 1 year with surveillance echo and carotid US (Aug 2025)    Дмитрий Limon MD, FACC

## 2024-09-23 DIAGNOSIS — I42.8 NON-ISCHEMIC CARDIOMYOPATHY: ICD-10-CM

## 2024-09-23 NOTE — TELEPHONE ENCOUNTER
----- Message from Rubi So sent at 9/23/2024 11:35 AM CDT -----  .Type: RX Refill Request    Who Called: Amish - son     Have you contacted your pharmacy:No     Refill or New Rx: Refill     RX Name and Strength:spironolactone (ALDACTONE) 25 MG tablet    Preferred Pharmacy with phone number:.  [facility]/pharmacy #[id number] - [place] - [address] [place]  [address]  [place] [address]  Phone: [phone] Fax: [phone]        Local or Mail Order: Local     Ordering Provider: Дмитрий Limon     Would the patient rather a call back or a response via [facility]? Call Back     Best Call Back Number: [phone](work)      Additional Information:

## 2024-09-24 RX ORDER — SPIRONOLACTONE 25 MG/1
25 TABLET ORAL DAILY
Qty: 90 TABLET | Refills: 3 | Status: SHIPPED | OUTPATIENT
Start: 2024-09-24

## (undated) DEVICE — KIT PROBE COVER WITH GEL

## (undated) DEVICE — SET CO-SET CLOSED INJ

## (undated) DEVICE — CATH PIG145 LANGSTON 6FR 110CM

## (undated) DEVICE — ELECTRODE EDGE SYSTEM RTS

## (undated) DEVICE — TRANSDUCER ADULT DISP

## (undated) DEVICE — KIT SYR REUSABLE

## (undated) DEVICE — KIT HAND CONTROL HIGH PRESSUR

## (undated) DEVICE — KIT MANIFOLD LOW PRESS TUBING

## (undated) DEVICE — INTRODUCER CATH 7F 11CML

## (undated) DEVICE — WIRE GUIDE FIX CORE STR START

## (undated) DEVICE — INTRODUCER CATH 6F 11CM

## (undated) DEVICE — PACK CATH LAB

## (undated) DEVICE — OMNIPAQUE 350MG 150ML VIAL

## (undated) DEVICE — CATH SWAN GANZ 7FR 110CM

## (undated) DEVICE — GUIDEWIRE SAFE T J TIP 145X2.5

## (undated) DEVICE — CATH DXTERITY JL40 100CM 6FR

## (undated) DEVICE — CATH DXTERITY JR40 100CM 6FR